# Patient Record
Sex: MALE | Race: WHITE
[De-identification: names, ages, dates, MRNs, and addresses within clinical notes are randomized per-mention and may not be internally consistent; named-entity substitution may affect disease eponyms.]

---

## 2019-01-20 ENCOUNTER — RX RENEWAL (OUTPATIENT)
Age: 59
End: 2019-01-20

## 2019-03-28 ENCOUNTER — APPOINTMENT (OUTPATIENT)
Dept: CARDIOLOGY | Facility: CLINIC | Age: 59
End: 2019-03-28
Payer: COMMERCIAL

## 2019-03-28 VITALS
DIASTOLIC BLOOD PRESSURE: 70 MMHG | WEIGHT: 188 LBS | SYSTOLIC BLOOD PRESSURE: 125 MMHG | HEIGHT: 67 IN | BODY MASS INDEX: 29.51 KG/M2

## 2019-03-28 DIAGNOSIS — Z78.9 OTHER SPECIFIED HEALTH STATUS: ICD-10-CM

## 2019-03-28 PROCEDURE — 99214 OFFICE O/P EST MOD 30 MIN: CPT

## 2019-03-28 NOTE — PHYSICAL EXAM
[General Appearance - Well Developed] : well developed [Normal Appearance] : normal appearance [Well Groomed] : well groomed [General Appearance - Well Nourished] : well nourished [No Deformities] : no deformities [General Appearance - In No Acute Distress] : no acute distress no

## 2019-03-28 NOTE — HISTORY OF PRESENT ILLNESS
[FreeTextEntry1] : pt is feeling well\par  no chest pain\par  diabetes is better\par  fbs at home 110 to 120\par  hgba1c  done on 3/25/19 6.5 well controlled\par  bp 130/80

## 2019-04-26 ENCOUNTER — RX RENEWAL (OUTPATIENT)
Age: 59
End: 2019-04-26

## 2019-05-20 ENCOUNTER — RX RENEWAL (OUTPATIENT)
Age: 59
End: 2019-05-20

## 2019-06-12 ENCOUNTER — MEDICATION RENEWAL (OUTPATIENT)
Age: 59
End: 2019-06-12

## 2019-06-12 ENCOUNTER — RX RENEWAL (OUTPATIENT)
Age: 59
End: 2019-06-12

## 2019-06-18 ENCOUNTER — RX RENEWAL (OUTPATIENT)
Age: 59
End: 2019-06-18

## 2019-07-18 ENCOUNTER — APPOINTMENT (OUTPATIENT)
Dept: CARDIOLOGY | Facility: CLINIC | Age: 59
End: 2019-07-18
Payer: COMMERCIAL

## 2019-07-18 VITALS
SYSTOLIC BLOOD PRESSURE: 118 MMHG | DIASTOLIC BLOOD PRESSURE: 70 MMHG | WEIGHT: 191 LBS | HEIGHT: 67 IN | BODY MASS INDEX: 29.98 KG/M2

## 2019-07-18 PROCEDURE — 99213 OFFICE O/P EST LOW 20 MIN: CPT

## 2019-07-18 NOTE — HISTORY OF PRESENT ILLNESS
[FreeTextEntry1] : pt is feeling well\par  last blood work hgba1c 6.5\par  no chest pains\par  no hypoglycemia

## 2019-08-06 ENCOUNTER — RX RENEWAL (OUTPATIENT)
Age: 59
End: 2019-08-06

## 2019-09-12 ENCOUNTER — MEDICATION RENEWAL (OUTPATIENT)
Age: 59
End: 2019-09-12

## 2019-10-23 ENCOUNTER — RX RENEWAL (OUTPATIENT)
Age: 59
End: 2019-10-23

## 2019-10-31 ENCOUNTER — APPOINTMENT (OUTPATIENT)
Dept: CARDIOLOGY | Facility: CLINIC | Age: 59
End: 2019-10-31

## 2019-11-13 ENCOUNTER — RX RENEWAL (OUTPATIENT)
Age: 59
End: 2019-11-13

## 2019-12-10 ENCOUNTER — RX RENEWAL (OUTPATIENT)
Age: 59
End: 2019-12-10

## 2019-12-11 ENCOUNTER — RX RENEWAL (OUTPATIENT)
Age: 59
End: 2019-12-11

## 2019-12-12 ENCOUNTER — RX RENEWAL (OUTPATIENT)
Age: 59
End: 2019-12-12

## 2019-12-30 DIAGNOSIS — Z79.01 LONG TERM (CURRENT) USE OF ANTICOAGULANTS: ICD-10-CM

## 2020-01-10 LAB
INR PPP: 2.3 RATIO
POCT-PROTHROMBIN TIME: 27.1 SECS

## 2020-01-11 ENCOUNTER — INPATIENT (INPATIENT)
Facility: HOSPITAL | Age: 60
LOS: 0 days | Discharge: HOME | End: 2020-01-12
Attending: HOSPITALIST | Admitting: HOSPITALIST
Payer: COMMERCIAL

## 2020-01-11 VITALS
HEART RATE: 83 BPM | TEMPERATURE: 96 F | DIASTOLIC BLOOD PRESSURE: 90 MMHG | RESPIRATION RATE: 18 BRPM | OXYGEN SATURATION: 99 % | SYSTOLIC BLOOD PRESSURE: 180 MMHG

## 2020-01-11 LAB
ALBUMIN SERPL ELPH-MCNC: 4.6 G/DL — SIGNIFICANT CHANGE UP (ref 3.5–5.2)
ALP SERPL-CCNC: 82 U/L — SIGNIFICANT CHANGE UP (ref 30–115)
ALT FLD-CCNC: 34 U/L — SIGNIFICANT CHANGE UP (ref 0–41)
ANION GAP SERPL CALC-SCNC: 18 MMOL/L — HIGH (ref 7–14)
APPEARANCE UR: CLEAR — SIGNIFICANT CHANGE UP
AST SERPL-CCNC: 22 U/L — SIGNIFICANT CHANGE UP (ref 0–41)
BACTERIA # UR AUTO: NEGATIVE — SIGNIFICANT CHANGE UP
BASOPHILS # BLD AUTO: 0.04 K/UL — SIGNIFICANT CHANGE UP (ref 0–0.2)
BASOPHILS NFR BLD AUTO: 0.6 % — SIGNIFICANT CHANGE UP (ref 0–1)
BILIRUB DIRECT SERPL-MCNC: <0.2 MG/DL — SIGNIFICANT CHANGE UP (ref 0–0.2)
BILIRUB INDIRECT FLD-MCNC: >0.1 MG/DL — LOW (ref 0.2–1.2)
BILIRUB SERPL-MCNC: 0.3 MG/DL — SIGNIFICANT CHANGE UP (ref 0.2–1.2)
BILIRUB UR-MCNC: NEGATIVE — SIGNIFICANT CHANGE UP
BUN SERPL-MCNC: 15 MG/DL — SIGNIFICANT CHANGE UP (ref 10–20)
CALCIUM SERPL-MCNC: 9.7 MG/DL — SIGNIFICANT CHANGE UP (ref 8.5–10.1)
CHLORIDE SERPL-SCNC: 101 MMOL/L — SIGNIFICANT CHANGE UP (ref 98–110)
CO2 SERPL-SCNC: 20 MMOL/L — SIGNIFICANT CHANGE UP (ref 17–32)
COLOR SPEC: SIGNIFICANT CHANGE UP
CREAT SERPL-MCNC: 0.8 MG/DL — SIGNIFICANT CHANGE UP (ref 0.7–1.5)
DIFF PNL FLD: ABNORMAL
EOSINOPHIL # BLD AUTO: 0.27 K/UL — SIGNIFICANT CHANGE UP (ref 0–0.7)
EOSINOPHIL NFR BLD AUTO: 4.1 % — SIGNIFICANT CHANGE UP (ref 0–8)
EPI CELLS # UR: 0 /HPF — SIGNIFICANT CHANGE UP (ref 0–5)
GLUCOSE BLDC GLUCOMTR-MCNC: 201 MG/DL — HIGH (ref 70–99)
GLUCOSE SERPL-MCNC: 195 MG/DL — HIGH (ref 70–99)
GLUCOSE UR QL: NEGATIVE — SIGNIFICANT CHANGE UP
HCT VFR BLD CALC: 37.5 % — LOW (ref 42–52)
HGB BLD-MCNC: 13.2 G/DL — LOW (ref 14–18)
HYALINE CASTS # UR AUTO: 1 /LPF — SIGNIFICANT CHANGE UP (ref 0–7)
IMM GRANULOCYTES NFR BLD AUTO: 0.6 % — HIGH (ref 0.1–0.3)
KETONES UR-MCNC: SIGNIFICANT CHANGE UP
LACTATE SERPL-SCNC: 2.2 MMOL/L — HIGH (ref 0.7–2)
LACTATE SERPL-SCNC: 3.7 MMOL/L — HIGH (ref 0.7–2)
LEUKOCYTE ESTERASE UR-ACNC: NEGATIVE — SIGNIFICANT CHANGE UP
LIDOCAIN IGE QN: 33 U/L — SIGNIFICANT CHANGE UP (ref 7–60)
LYMPHOCYTES # BLD AUTO: 1.77 K/UL — SIGNIFICANT CHANGE UP (ref 1.2–3.4)
LYMPHOCYTES # BLD AUTO: 27.1 % — SIGNIFICANT CHANGE UP (ref 20.5–51.1)
MCHC RBC-ENTMCNC: 32 PG — HIGH (ref 27–31)
MCHC RBC-ENTMCNC: 35.2 G/DL — SIGNIFICANT CHANGE UP (ref 32–37)
MCV RBC AUTO: 91 FL — SIGNIFICANT CHANGE UP (ref 80–94)
MONOCYTES # BLD AUTO: 0.44 K/UL — SIGNIFICANT CHANGE UP (ref 0.1–0.6)
MONOCYTES NFR BLD AUTO: 6.7 % — SIGNIFICANT CHANGE UP (ref 1.7–9.3)
NEUTROPHILS # BLD AUTO: 3.96 K/UL — SIGNIFICANT CHANGE UP (ref 1.4–6.5)
NEUTROPHILS NFR BLD AUTO: 60.9 % — SIGNIFICANT CHANGE UP (ref 42.2–75.2)
NITRITE UR-MCNC: NEGATIVE — SIGNIFICANT CHANGE UP
NRBC # BLD: 0 /100 WBCS — SIGNIFICANT CHANGE UP (ref 0–0)
PH UR: 6.5 — SIGNIFICANT CHANGE UP (ref 5–8)
PLATELET # BLD AUTO: 178 K/UL — SIGNIFICANT CHANGE UP (ref 130–400)
POTASSIUM SERPL-MCNC: 4.2 MMOL/L — SIGNIFICANT CHANGE UP (ref 3.5–5)
POTASSIUM SERPL-SCNC: 4.2 MMOL/L — SIGNIFICANT CHANGE UP (ref 3.5–5)
PROT SERPL-MCNC: 7.7 G/DL — SIGNIFICANT CHANGE UP (ref 6–8)
PROT UR-MCNC: ABNORMAL
RBC # BLD: 4.12 M/UL — LOW (ref 4.7–6.1)
RBC # FLD: 12 % — SIGNIFICANT CHANGE UP (ref 11.5–14.5)
RBC CASTS # UR COMP ASSIST: 31 /HPF — HIGH (ref 0–4)
SODIUM SERPL-SCNC: 139 MMOL/L — SIGNIFICANT CHANGE UP (ref 135–146)
SP GR SPEC: 1.02 — SIGNIFICANT CHANGE UP (ref 1.01–1.02)
UROBILINOGEN FLD QL: SIGNIFICANT CHANGE UP
WBC # BLD: 6.52 K/UL — SIGNIFICANT CHANGE UP (ref 4.8–10.8)
WBC # FLD AUTO: 6.52 K/UL — SIGNIFICANT CHANGE UP (ref 4.8–10.8)
WBC UR QL: 1 /HPF — SIGNIFICANT CHANGE UP (ref 0–5)

## 2020-01-11 PROCEDURE — 99222 1ST HOSP IP/OBS MODERATE 55: CPT

## 2020-01-11 PROCEDURE — 74177 CT ABD & PELVIS W/CONTRAST: CPT | Mod: 26

## 2020-01-11 PROCEDURE — 99285 EMERGENCY DEPT VISIT HI MDM: CPT

## 2020-01-11 RX ORDER — MORPHINE SULFATE 50 MG/1
6 CAPSULE, EXTENDED RELEASE ORAL ONCE
Refills: 0 | Status: DISCONTINUED | OUTPATIENT
Start: 2020-01-11 | End: 2020-01-11

## 2020-01-11 RX ORDER — DEXTROSE 50 % IN WATER 50 %
25 SYRINGE (ML) INTRAVENOUS ONCE
Refills: 0 | Status: DISCONTINUED | OUTPATIENT
Start: 2020-01-11 | End: 2020-01-12

## 2020-01-11 RX ORDER — SODIUM CHLORIDE 9 MG/ML
1000 INJECTION, SOLUTION INTRAVENOUS ONCE
Refills: 0 | Status: COMPLETED | OUTPATIENT
Start: 2020-01-11 | End: 2020-01-11

## 2020-01-11 RX ORDER — HEPARIN SODIUM 5000 [USP'U]/ML
5000 INJECTION INTRAVENOUS; SUBCUTANEOUS EVERY 12 HOURS
Refills: 0 | Status: DISCONTINUED | OUTPATIENT
Start: 2020-01-11 | End: 2020-01-12

## 2020-01-11 RX ORDER — MORPHINE SULFATE 50 MG/1
4 CAPSULE, EXTENDED RELEASE ORAL EVERY 4 HOURS
Refills: 0 | Status: DISCONTINUED | OUTPATIENT
Start: 2020-01-11 | End: 2020-01-12

## 2020-01-11 RX ORDER — DEXTROSE 50 % IN WATER 50 %
15 SYRINGE (ML) INTRAVENOUS ONCE
Refills: 0 | Status: DISCONTINUED | OUTPATIENT
Start: 2020-01-11 | End: 2020-01-12

## 2020-01-11 RX ORDER — TAMSULOSIN HYDROCHLORIDE 0.4 MG/1
0.4 CAPSULE ORAL AT BEDTIME
Refills: 0 | Status: DISCONTINUED | OUTPATIENT
Start: 2020-01-11 | End: 2020-01-12

## 2020-01-11 RX ORDER — TICAGRELOR 90 MG/1
60 TABLET ORAL
Refills: 0 | Status: DISCONTINUED | OUTPATIENT
Start: 2020-01-11 | End: 2020-01-12

## 2020-01-11 RX ORDER — KETOROLAC TROMETHAMINE 30 MG/ML
15 SYRINGE (ML) INJECTION ONCE
Refills: 0 | Status: DISCONTINUED | OUTPATIENT
Start: 2020-01-11 | End: 2020-01-11

## 2020-01-11 RX ORDER — ATORVASTATIN CALCIUM 80 MG/1
40 TABLET, FILM COATED ORAL AT BEDTIME
Refills: 0 | Status: DISCONTINUED | OUTPATIENT
Start: 2020-01-11 | End: 2020-01-12

## 2020-01-11 RX ORDER — AMLODIPINE BESYLATE 2.5 MG/1
5 TABLET ORAL DAILY
Refills: 0 | Status: DISCONTINUED | OUTPATIENT
Start: 2020-01-11 | End: 2020-01-12

## 2020-01-11 RX ORDER — ASPIRIN/CALCIUM CARB/MAGNESIUM 324 MG
81 TABLET ORAL DAILY
Refills: 0 | Status: DISCONTINUED | OUTPATIENT
Start: 2020-01-11 | End: 2020-01-12

## 2020-01-11 RX ORDER — INSULIN LISPRO 100/ML
5 VIAL (ML) SUBCUTANEOUS
Refills: 0 | Status: DISCONTINUED | OUTPATIENT
Start: 2020-01-11 | End: 2020-01-12

## 2020-01-11 RX ORDER — INSULIN LISPRO 100/ML
VIAL (ML) SUBCUTANEOUS
Refills: 0 | Status: DISCONTINUED | OUTPATIENT
Start: 2020-01-11 | End: 2020-01-12

## 2020-01-11 RX ORDER — SODIUM CHLORIDE 9 MG/ML
1000 INJECTION, SOLUTION INTRAVENOUS
Refills: 0 | Status: DISCONTINUED | OUTPATIENT
Start: 2020-01-11 | End: 2020-01-12

## 2020-01-11 RX ORDER — INSULIN GLARGINE 100 [IU]/ML
8 INJECTION, SOLUTION SUBCUTANEOUS AT BEDTIME
Refills: 0 | Status: DISCONTINUED | OUTPATIENT
Start: 2020-01-11 | End: 2020-01-12

## 2020-01-11 RX ORDER — GLUCAGON INJECTION, SOLUTION 0.5 MG/.1ML
1 INJECTION, SOLUTION SUBCUTANEOUS ONCE
Refills: 0 | Status: DISCONTINUED | OUTPATIENT
Start: 2020-01-11 | End: 2020-01-12

## 2020-01-11 RX ORDER — DEXTROSE 50 % IN WATER 50 %
12.5 SYRINGE (ML) INTRAVENOUS ONCE
Refills: 0 | Status: DISCONTINUED | OUTPATIENT
Start: 2020-01-11 | End: 2020-01-12

## 2020-01-11 RX ADMIN — SODIUM CHLORIDE 1000 MILLILITER(S): 9 INJECTION, SOLUTION INTRAVENOUS at 15:02

## 2020-01-11 RX ADMIN — SODIUM CHLORIDE 75 MILLILITER(S): 9 INJECTION, SOLUTION INTRAVENOUS at 22:50

## 2020-01-11 RX ADMIN — Medication 15 MILLIGRAM(S): at 17:38

## 2020-01-11 RX ADMIN — MORPHINE SULFATE 6 MILLIGRAM(S): 50 CAPSULE, EXTENDED RELEASE ORAL at 15:25

## 2020-01-11 RX ADMIN — Medication 15 MILLIGRAM(S): at 13:17

## 2020-01-11 RX ADMIN — MORPHINE SULFATE 6 MILLIGRAM(S): 50 CAPSULE, EXTENDED RELEASE ORAL at 19:21

## 2020-01-11 RX ADMIN — TAMSULOSIN HYDROCHLORIDE 0.4 MILLIGRAM(S): 0.4 CAPSULE ORAL at 23:16

## 2020-01-11 RX ADMIN — MORPHINE SULFATE 6 MILLIGRAM(S): 50 CAPSULE, EXTENDED RELEASE ORAL at 15:01

## 2020-01-11 RX ADMIN — SODIUM CHLORIDE 1000 MILLILITER(S): 9 INJECTION, SOLUTION INTRAVENOUS at 13:17

## 2020-01-11 RX ADMIN — MORPHINE SULFATE 6 MILLIGRAM(S): 50 CAPSULE, EXTENDED RELEASE ORAL at 20:17

## 2020-01-11 NOTE — ED PROVIDER NOTE - OBJECTIVE STATEMENT
58 y/o M PMHx HTN, HLD, DM, brain aneurysm s/p coil on Brilinta p/w R flank pain that started last night. Described as mild, aching, and radiating to R groin. No OTC meds for pain relief. Pt admits to urinary hesitancy. Denies fever, chills, CP, SOB, n/v, dysuria, hematuria, and rash.

## 2020-01-11 NOTE — CONSULT NOTE ADULT - SUBJECTIVE AND OBJECTIVE BOX
HPI:  58 y/o male with hx of HTN, DM, brain aneurysm s/p coiling on Brilinta and nephrolithiasis requiring multiple procedures in the past including stent placements and lithotripsy. Presents today with 1 day hx of colicky R flank pain radiating to R groin. Deneis any fevers, chills, vomiting. Denies dysuria, hematuria or difficulty voiding but is experiencing increased frequency.     PAST MEDICAL & SURGICAL HISTORY:      REVIEW OF SYSTEMS:    CONSTITUTIONAL:  No fevers or chills  HEENT: No visual changes  ENDO: No sweating  NECK: No pain or stiffness  MUSCULOSKELETAL: No back pain, no joint pain  RESPIRATORY: No shortness of breath  CARDIOVASCULAR: No chest pain  GASTROINTESTINAL: No abdominal or epigastric pain. No nausea, vomiting,  No diarrhea or constipation.   NEUROLOGICAL: No mental status changes  PSYCH: No depression, no mood changes  SKIN: No itching      MEDICATIONS  (STANDING):  morphine  - Injectable 6 milliGRAM(s) IV Push Once    MEDICATIONS  (PRN):      Allergies    No Known Allergies    Intolerances        SOCIAL HISTORY: No illicit drug use    FAMILY HISTORY:      Vital Signs Last 24 Hrs  T(C): 35.6 (2020 16:30), Max: 36.6 (2020 14:55)  T(F): 96.1 (2020 16:30), Max: 97.9 (2020 14:55)  HR: 63 (2020 16:30) (63 - 83)  BP: 146/69 (2020 16:30) (146/69 - 180/90)  BP(mean): --  RR: 20 (2020 16:30) (18 - 20)  SpO2: 100% (2020 16:30) (99% - 100%)    PHYSICAL EXAM:    Constitutional: NAD, well-developed, awake/alert  HEENT: EOMI  Neck: no pain  Back: No CVA tenderness B/L, + R flank pain  Respiratory: No accessory respiratory muscle use  Abd: Soft, NT/ND, bladder non palpable, no suprapubic tenderness  Extremities: no edema  Neurological: A/O x 3  Psychiatric: Normal mood, normal affect      I&O's Summary      LABS:                        13.2   6.52  )-----------( 178      ( 2020 12:50 )             37.5     11    139  |  101  |  15  ----------------------------<  195<H>  4.2   |  20  |  0.8    Ca    9.7      2020 12:50    TPro  7.7  /  Alb  4.6  /  TBili  0.3  /  DBili  <0.2  /  AST  22  /  ALT  34  /  AlkPhos  82  -      Urinalysis Basic - ( 2020 15:40 )    Color: Light Yellow / Appearance: Clear / S.021 / pH: x  Gluc: x / Ketone: Trace  / Bili: Negative / Urobili: <2 mg/dL   Blood: x / Protein: 30 mg/dL / Nitrite: Negative   Leuk Esterase: Negative / RBC: 31 /HPF / WBC 1 /HPF   Sq Epi: x / Non Sq Epi: 0 /HPF / Bacteria: Negative            RADIOLOGY & ADDITIONAL STUDIES:  < from: CT Abdomen and Pelvis w/ IV Cont (20 @ 17:19) >    EXAM:  CT ABDOMEN AND PELVIS IC            PROCEDURE DATE:  2020            INTERPRETATION:  CLINICAL STATEMENT: Right flank pain.      TECHNIQUE: Contiguous axial CT images were obtained from the lower chest to the pubic symphysis following administration of 100cc Optiray 320 intravenous contrast.  Oral contrast was not administered.  Reformatted images in the coronal and sagittal planes were acquired.    COMPARISON CT: Abdomen and pelvis dated 2010.    OTHER STUDIES USED FOR CORRELATION: None.       FINDINGS:    LOWER CHEST: Dependent bibasilar atelectasis.    HEPATOBILIARY: Hepatic steatosis. No intrahepatic or extrahepatic biliary ductal dilatation. The gallbladder is present.    SPLEEN: Unremarkable.    PANCREAS: Unremarkable.    ADRENAL GLANDS: Unremarkable.    KIDNEYS: Moderate right hydroureteronephrosis secondary to an obstructing calculus in the proximal right ureter measuring up to 11 mm with maximal density of 842 Hounsfield units. In addition there is a second 6.5 mm stone in the distal right ureter. Multiple nonobstructing renal calculi bilaterally, the largest in the right lower pole measuring up to 4 mm. Bilateral renal cysts and subcentimeter hypodensities that are too small to further characterize. No left hydronephrosis    ABDOMINOPELVIC NODES: No abdominopelvic lymphadenopathy.    PELVIC ORGANS: Distended urinary bladder.    PERITONEUM/MESENTERY/BOWEL: Nonspecific soft tissue density anterior to the right urinary bladder measuring up to 2.3 x 2.2 cm (series 3 image 93). No evidence of bowel obstruction, pneumoperitoneum, or ascites. Normal caliber appendix. There is mild right perinephric and periureteral stranding.    BONES/SOFT TISSUES: No evidence of acute osseous abnormality. Degenerative changes of the visualized thoracal lumbar spine.    OTHER: Calcified atherosclerotic disease of the aorta and branch vessels.      IMPRESSION:     Moderate right hydroureteronephrosis secondary to an 11 mm obstructing calculus in the proximal right ureter. In addition there is a second 6.5 mm stone in the distal right ureter.    Multiple nonobstructing renal calculi in the bilateral collecting systems.     soft tissue density anterior to the urinary bladder, measuring up to 2.2 cm. Etiology not apparent in this exam.    Hepatic steatosis.              GERALD DURON M.D., RESIDENT RADIOLOGIST  This document has been electronically signed.  JEFRY BRADY M.D., ATTENDING RADIOLOGIST  This document has been electronically signed. 2020  6:05PM                < end of copied text > HPI:  60 y/o male with hx of HTN, DM, brain aneurysm s/p coiling on Brilinta and nephrolithiasis requiring multiple procedures in the past including stent placements and lithotripsy. Presents today with 1 day hx of colicky intermittent R flank pain radiating to R groin. Deneis any fevers, chills, vomiting. Denies dysuria, hematuria or difficulty voiding but is experiencing increased frequency.     PAST MEDICAL & SURGICAL HISTORY:      REVIEW OF SYSTEMS:    CONSTITUTIONAL:  No fevers or chills  HEENT: No visual changes  ENDO: No sweating  NECK: No pain or stiffness  MUSCULOSKELETAL: No back pain, no joint pain  RESPIRATORY: No shortness of breath  CARDIOVASCULAR: No chest pain  GASTROINTESTINAL: No abdominal or epigastric pain. No nausea, vomiting,  No diarrhea or constipation.   NEUROLOGICAL: No mental status changes  PSYCH: No depression, no mood changes  SKIN: No itching      MEDICATIONS  (STANDING):  morphine  - Injectable 6 milliGRAM(s) IV Push Once    MEDICATIONS  (PRN):      Allergies    No Known Allergies    Intolerances        SOCIAL HISTORY: No illicit drug use    FAMILY HISTORY:  no gu malig    Vital Signs Last 24 Hrs  T(C): 35.6 (2020 16:30), Max: 36.6 (2020 14:55)  T(F): 96.1 (2020 16:30), Max: 97.9 (2020 14:55)  HR: 63 (2020 16:30) (63 - 83)  BP: 146/69 (2020 16:30) (146/69 - 180/90)  BP(mean): --  RR: 20 (2020 16:30) (18 - 20)  SpO2: 100% (2020 16:30) (99% - 100%)    PHYSICAL EXAM:    Constitutional: NAD, well-developed, awake/alert  HEENT: EOMI  Neck: no pain  Back: No CVA tenderness B/L, + R flank pain  Respiratory: No accessory respiratory muscle use  Abd: Soft, NT/ND, bladder non palpable, no suprapubic tenderness  Extremities: no edema  Neurological: A/O x 3  Psychiatric: Normal mood, normal affect      I&O's Summary      LABS:                        13.2   6.52  )-----------( 178      ( 2020 12:50 )             37.5     11    139  |  101  |  15  ----------------------------<  195<H>  4.2   |  20  |  0.8    Ca    9.7      2020 12:50    TPro  7.7  /  Alb  4.6  /  TBili  0.3  /  DBili  <0.2  /  AST  22  /  ALT  34  /  AlkPhos  82  01-11      Urinalysis Basic - ( 2020 15:40 )    Color: Light Yellow / Appearance: Clear / S.021 / pH: x  Gluc: x / Ketone: Trace  / Bili: Negative / Urobili: <2 mg/dL   Blood: x / Protein: 30 mg/dL / Nitrite: Negative   Leuk Esterase: Negative / RBC: 31 /HPF / WBC 1 /HPF   Sq Epi: x / Non Sq Epi: 0 /HPF / Bacteria: Negative            RADIOLOGY & ADDITIONAL STUDIES:  < from: CT Abdomen and Pelvis w/ IV Cont (20 @ 17:19) >    EXAM:  CT ABDOMEN AND PELVIS IC            PROCEDURE DATE:  2020            INTERPRETATION:  CLINICAL STATEMENT: Right flank pain.      TECHNIQUE: Contiguous axial CT images were obtained from the lower chest to the pubic symphysis following administration of 100cc Optiray 320 intravenous contrast.  Oral contrast was not administered.  Reformatted images in the coronal and sagittal planes were acquired.    COMPARISON CT: Abdomen and pelvis dated 2010.    OTHER STUDIES USED FOR CORRELATION: None.       FINDINGS:    LOWER CHEST: Dependent bibasilar atelectasis.    HEPATOBILIARY: Hepatic steatosis. No intrahepatic or extrahepatic biliary ductal dilatation. The gallbladder is present.    SPLEEN: Unremarkable.    PANCREAS: Unremarkable.    ADRENAL GLANDS: Unremarkable.    KIDNEYS: Moderate right hydroureteronephrosis secondary to an obstructing calculus in the proximal right ureter measuring up to 11 mm with maximal density of 842 Hounsfield units. In addition there is a second 6.5 mm stone in the distal right ureter. Multiple nonobstructing renal calculi bilaterally, the largest in the right lower pole measuring up to 4 mm. Bilateral renal cysts and subcentimeter hypodensities that are too small to further characterize. No left hydronephrosis    ABDOMINOPELVIC NODES: No abdominopelvic lymphadenopathy.    PELVIC ORGANS: Distended urinary bladder.    PERITONEUM/MESENTERY/BOWEL: Nonspecific soft tissue density anterior to the right urinary bladder measuring up to 2.3 x 2.2 cm (series 3 image 93). No evidence of bowel obstruction, pneumoperitoneum, or ascites. Normal caliber appendix. There is mild right perinephric and periureteral stranding.    BONES/SOFT TISSUES: No evidence of acute osseous abnormality. Degenerative changes of the visualized thoracal lumbar spine.    OTHER: Calcified atherosclerotic disease of the aorta and branch vessels.      IMPRESSION:     Moderate right hydroureteronephrosis secondary to an 11 mm obstructing calculus in the proximal right ureter. In addition there is a second 6.5 mm stone in the distal right ureter.    Multiple nonobstructing renal calculi in the bilateral collecting systems.     soft tissue density anterior to the urinary bladder, measuring up to 2.2 cm. Etiology not apparent in this exam.    Hepatic steatosis.              GERALD DURON M.D., RESIDENT RADIOLOGIST  This document has been electronically signed.  JEFRY BRADY M.D., ATTENDING RADIOLOGIST  This document has been electronically signed. 2020  6:05PM                < end of copied text >

## 2020-01-11 NOTE — ED PROVIDER NOTE - PROGRESS NOTE DETAILS
uro radha aware will see in ed CT result noted with 1.1cm prox ureteral stone. Endorsed to Dr Coleman to f/u urology eval and dispo. urology bedside, recommending admission to medicine. plan for urologic procedure tomorrow by Dr. ma. chema rossi aware, aware for toradol for pain meds, npo after midnight. mar will follow rpt lactate.

## 2020-01-11 NOTE — ED PROVIDER NOTE - ATTENDING CONTRIBUTION TO CARE
60yo man h/o HTN, HLD, DM, cerebral aneurysm s/p coil, on Brilinta, also frequent kidney stones c/o R flank pain that began last night and worsened this am, associated with nausea. Radiates to the groin. No gross hematuria, fever, chills, back pain. On exam he is nontoxic but uncomfortable, lungs CTA, CVS1S2 RRR abd soft, NT, R CVA discomfort with palpation, no rash. Will check labs, UA, CT abd/pelvis for stone.

## 2020-01-11 NOTE — CONSULT NOTE ADULT - ASSESSMENT
60 y/o male with obstructing 11mm and 6mm R ureteral calculus and moderate right hydro  - UA, UCx  - IV fluid hydration  - Monitor vitals  - Flomax  - Pain control  - On addon for cystoscopy stent placement tomorrow morning  - NPO after midnight  - Discussed with Dr. Cai

## 2020-01-11 NOTE — CONSULT NOTE ADULT - ATTENDING COMMENTS
pt seen and examined. having renal colic refractory to pain medication. ct a/p images visualized show obstructing large ureteral stone and an additioanl stone in ureter w hydro. pt understood r/b/a ofprocedure. understood stent can only stay in max 3 months and he has to fu w me as outpatinet to have stones removed.

## 2020-01-11 NOTE — H&P ADULT - ASSESSMENT
58 y/o male with hx of HTN, DM, brain aneurysm s/p coiling on Brilinta and nephrolithiasis requiring multiple procedures in the past including stent placements and lithotripsy. Presents today with 1 day hx of colicky R flank pain radiating to R groin. Deneis any fevers, chills, vomiting. Denies dysuria, hematuria or difficulty voiding but is experiencing increased frequency. In the Ed pt underwent CT scan which was consistent with Right sided hydro along with urolithiasis. Pt was evaluated by urology in ED.     #) obstructive uropathy   - admit to medicine  - CT scan consistent with hydro  - IV fluid hydration  - Flomax  - Pain control  - On addon for cystoscopy stent placement tomorrow morning per urology   - NPO after midnight    #) Brain aneurysm s/p coiling  - continue brillinta and aspirin    #) HTN  - can use amlodipine for now    #) DM  - FS and insulin protocol    #) DLD  - statin    #) Diet NPO overnight     #) DVT PPX heparin

## 2020-01-11 NOTE — H&P ADULT - NSHPLABSRESULTS_GEN_ALL_CORE
CBC Full  -  ( 11 Jan 2020 12:50 )  WBC Count : 6.52 K/uL  RBC Count : 4.12 M/uL  Hemoglobin : 13.2 g/dL  Hematocrit : 37.5 %  Platelet Count - Automated : 178 K/uL  Mean Cell Volume : 91.0 fL  Mean Cell Hemoglobin : 32.0 pg  Mean Cell Hemoglobin Concentration : 35.2 g/dL  Auto Neutrophil # : 3.96 K/uL  Auto Lymphocyte # : 1.77 K/uL  Auto Monocyte # : 0.44 K/uL  Auto Eosinophil # : 0.27 K/uL  Auto Basophil # : 0.04 K/uL  Auto Neutrophil % : 60.9 %  Auto Lymphocyte % : 27.1 %  Auto Monocyte % : 6.7 %  Auto Eosinophil % : 4.1 %  Auto Basophil % : 0.6 %      01-11    139  |  101  |  15  ----------------------------<  195<H>  4.2   |  20  |  0.8    Ca    9.7      11 Jan 2020 12:50    TPro  7.7  /  Alb  4.6  /  TBili  0.3  /  DBili  <0.2  /  AST  22  /  ALT  34  /  AlkPhos  82  01-11                    Moderate right hydroureteronephrosis secondary to an 11 mm obstructing calculus in the proximal right ureter. In addition there is a second 6.5 mm stone in the distal right ureter.    Multiple nonobstructing renal calculi in the bilateral collecting systems.     soft tissue density anterior to the urinary bladder, measuring up to 2.2 cm. Etiology not apparent in this exam.    Hepatic steatosis.

## 2020-01-11 NOTE — ED PROVIDER NOTE - NS ED ROS FT
Constitutional: (-) fever, (-) chills  Eyes: (-) visual changes  ENT: (-) nasal congestions  Cardiovascular: (-) chest pain, (-) syncope  Respiratory: (-) cough, (-) shortness of breath, (-) dyspnea,   Gastrointestinal: (-) vomiting, (-) diarrhea, (-)nausea, (+) flank pain   Musculoskeletal: (-) neck pain, (-) back pain, (-) joint pain,  Integumentary: (-) rash, (-) edema, (-) bruises  Neurological: (-) headache, (-) loc, (-) dizziness, (-) tingling, (-)numbness,  Peripheral Vascular: (-) leg swelling  :  (-)dysuria,  (-) hematuria, (+) hesitancy   Allergic/Immunologic: (-) pruritus

## 2020-01-11 NOTE — H&P ADULT - HISTORY OF PRESENT ILLNESS
60 y/o male with hx of HTN, DM, brain aneurysm s/p coiling on Brilinta and nephrolithiasis requiring multiple procedures in the past including stent placements and lithotripsy. Presents today with 1 day hx of colicky R flank pain radiating to R groin. Deneis any fevers, chills, vomiting. Denies dysuria, hematuria or difficulty voiding but is experiencing increased frequency. In the Ed pt underwent CT scan which was consistent with Right sided hydro along with urolithiasis. Pt was evaluated by urology in ED.     Pt not sure of HTN medication.

## 2020-01-11 NOTE — H&P ADULT - ATTENDING COMMENTS
I saw and evaluated patient  by bedside, c/o persistent right sided abdominal pain associated with hematuria, no fever.    All labs, radiology studies, VS was reviewed  Vital Signs Last 24 Hrs  T(C): 36.1 (12 Jan 2020 05:17), Max: 36.6 (11 Jan 2020 14:55)  T(F): 97 (12 Jan 2020 05:17), Max: 97.9 (11 Jan 2020 14:55)  HR: 70 (12 Jan 2020 05:17) (63 - 83)  BP: 142/73 (12 Jan 2020 05:17) (142/73 - 184/90)  RR: 18 (12 Jan 2020 05:17) (18 - 20)  SpO2: 99% (11 Jan 2020 18:45) (99% - 100%)  GENERAL: NAD, AAOX3, patient is laying comfortably in bed  HEENT: AT, NC, PERRLA, SUPPLE, NO JVD, NO CB  LUNG: CTA B/L  CVS: normal S1, S2, RRR, NO M/G/R  ABDOMEN: soft, bowel sounds present, normoactive in all 4 quadrants, tender in right upper/mid quadrant area during palpation, non-distended  EXT: no E/C/C, positive PP b/l extremities  NEURO: no acute focal neurological deficits  SKIN: no rash, no ecchymosis    I have reviewed the resident's note and agree with documented findings and  plan of care.  # Acute right sided obstructive uropathy due to nephrolithiasis- NPO, for cytoscopy and stent placement today. IVF, pain management.  #Lactic acidosis- resolved.  # Macrocytic anemia- start on daily vitamin B complex tx.  # h/o HTN, DM - resumed on home regimen tx.

## 2020-01-11 NOTE — H&P ADULT - NSHPPHYSICALEXAM_GEN_ALL_CORE
PHYSICAL EXAM:  GENERAL: NAD, well-developed  HEAD:  Atraumatic, Normocephalic  EYES: EOMI, PERRLA, conjunctiva and sclera clear  NECK: Supple, No JVD  CHEST/LUNG: Clear to auscultation bilaterally; No wheeze  HEART: Regular rate and rhythm; No murmurs, rubs, or gallops  ABDOMEN: Right flank pain  EXTREMITIES:  2+ Peripheral Pulses, No clubbing, cyanosis, or edema  PSYCH: AAOx3  NEUROLOGY: non-focal  SKIN: No rashes or lesions

## 2020-01-11 NOTE — ED PROVIDER NOTE - PHYSICAL EXAMINATION
Vital Signs: I have reviewed the initial vital signs.  Constitutional: well-nourished, appears stated age, no acute distress  Eyes: Conjunctiva pink, Sclera clear, PERRLA, EOMI.  Cardiovascular: S1 and S2, regular rate, regular rhythm, well-perfused extremities, radial pulses equal and 2+  Respiratory: unlabored respiratory effort, clear to auscultation bilaterally no wheezing, rales and rhonchi  Gastrointestinal: soft, (+) R CVA tenderness, no pulsatile mass, normal bowel sounds  Musculoskeletal: supple neck, no lower extremity edema, no midline tenderness  Integumentary: warm, dry, no rash  Neurologic: awake, alert, cranial nerves II-XII grossly intact, extremities’ motor and sensory functions grossly intact

## 2020-01-11 NOTE — ED ADULT NURSE NOTE - NSIMPLEMENTINTERV_GEN_ALL_ED
Implemented All Universal Safety Interventions:  [place] to call system. Call bell, personal items and telephone within reach. Instruct patient to call for assistance. Room bathroom lighting operational. Non-slip footwear when patient is off stretcher. Physically safe environment: no spills, clutter or unnecessary equipment. Stretcher in lowest position, wheels locked, appropriate side rails in place.

## 2020-01-11 NOTE — PATIENT PROFILE ADULT - NSPROMEDSPUMP_GEN_A_NUR
Subjective   Cachorro Davis is a 39 y.o. male.     Chief Complaint: Panic Attack    Panic Attack   This is a recurrent problem. The current episode started more than 1 year ago. The problem occurs every several days. The problem has been waxing and waning. Treatments tried: zoloft, cymbalta, buspar   The treatment provided no relief.   COPD   This is a chronic problem. The current episode started more than 1 year ago. The problem has been waxing and waning. Nothing aggravates the symptoms. Treatments tried: symbicort, albuterol. The treatment provided significant relief.   Diabetes   He presents for his follow-up diabetic visit. He has type 2 diabetes mellitus. His disease course has been stable. There are no hypoglycemic associated symptoms. Pertinent negatives for hypoglycemia include no headaches. There are no diabetic associated symptoms. Pertinent negatives for diabetes include no chest pain. There are no hypoglycemic complications. Symptoms are stable. There are no diabetic complications. Risk factors for coronary artery disease include diabetes mellitus, hypertension, male sex, obesity, sedentary lifestyle and tobacco exposure. Current diabetic treatment includes oral agent (monotherapy). He is following a generally unhealthy diet. When asked about meal planning, he reported none. He has not had a previous visit with a dietitian. He never participates in exercise. An ACE inhibitor/angiotensin II receptor blocker is being taken. He does not see a podiatrist.Eye exam is not current.  Blood sugar this morning 134.    Hypertension   This is a chronic problem. The current episode started more than 1 year ago. The problem is uncontrolled. Associated symptoms include peripheral edema. Pertinent negatives include no chest pain, headaches, palpitations or shortness of breath. There are no associated agents to hypertension. Risk factors for coronary artery disease include male gender, obesity, sedentary lifestyle,  smoking/tobacco exposure and diabetes mellitus. Past treatments include ACE inhibitors. Current antihypertension treatment includes ACE inhibitors. The current treatment provides mild improvement. Compliance problems include exercise and diet.    Heartburn   He complains of heartburn. He reports no abdominal pain, no chest pain or no nausea. This is a chronic problem. The current episode started more than 1 year ago. The problem occurs frequently. The heartburn does not wake him from sleep. The heartburn does not limit his activity. The heartburn doesn't change with position. The symptoms are aggravated by ETOH, medications, certain foods and smoking. Risk factors include lack of exercise, caffeine use, ETOH use, obesity and smoking/tobacco exposure. He has tried a PPI for the symptoms. The treatment provided moderate relief.   Nicotine Dependence   Presents for initial visit. Symptoms include cravings. Preferred tobacco types include cigarettes. Preferred cigarette types include filtered. Preferred strength is light. His urge triggers include company of smokers. His first smoke is from 8 to 10 AM. He smokes 2 packs of cigarettes per day. He started smoking when he was <12 years old. Past treatments include nothing. Cachorro is thinking about quitting. His past medical history is significant for alcohol abuse and drug use.   Back Pain   This is a chronic problem. The current episode started more than 1 year ago. The problem occurs constantly. The problem is unchanged. The pain is present in the thoracic spine and lumbar spine. The quality of the pain is described as aching and cramping. The pain does not radiate. The pain is moderate. The symptoms are aggravated by bending, standing and twisting. Pertinent negatives include no abdominal pain, bladder incontinence, bowel incontinence, chest pain or headaches. Risk factors include sedentary lifestyle and obesity. He has tried analgesics, muscle relaxant and NSAIDs for  the symptoms. The treatment provided no relief.            Family History   Problem Relation Age of Onset   • Cancer Mother    • Cancer Father    • Diabetes Maternal Grandmother    • Diabetes Maternal Grandfather    • Hypertension Maternal Grandfather    • Drug abuse Brother        Social History     Socioeconomic History   • Marital status: Legally      Spouse name: Not on file   • Number of children: Not on file   • Years of education: Not on file   • Highest education level: Not on file   Social Needs   • Financial resource strain: Not on file   • Food insecurity - worry: Not on file   • Food insecurity - inability: Not on file   • Transportation needs - medical: Not on file   • Transportation needs - non-medical: Not on file   Occupational History   • Not on file   Tobacco Use   • Smoking status: Current Every Day Smoker     Packs/day: 1.50     Types: Cigarettes   • Smokeless tobacco: Never Used   Substance and Sexual Activity   • Alcohol use: Yes     Binge frequency: Daily or almost daily     Comment: a pint of liquid daily    • Drug use: Yes     Types: Methamphetamines, Marijuana     Comment: gabapentin   • Sexual activity: Defer   Other Topics Concern   • Not on file   Social History Narrative   • Not on file       Past Medical History:   Diagnosis Date   • Anxiety     panic attacks   • Arthritis    • Chronic right shoulder pain    • COPD (chronic obstructive pulmonary disease) (CMS/HCC)    • Depression    • Diabetes mellitus (CMS/HCC)    • Dyslipidemia    • GERD (gastroesophageal reflux disease)    • Gout    • Hyperlipidemia    • Hypertension    • Insomnia    • Lower back pain    • Neuropathy    • Obesity    • Seizures (CMS/HCC)        Review of Systems   Constitutional: Negative.    HENT: Negative.    Respiratory: Negative.    Cardiovascular: Negative.    Gastrointestinal: Negative.    Musculoskeletal: Negative.    Skin: Negative.    Neurological: Negative.    Psychiatric/Behavioral: Negative for  "suicidal ideas. The patient is nervous/anxious.        Objective   Physical Exam   Constitutional: He is oriented to person, place, and time. He appears well-developed and well-nourished.   Neck: Normal range of motion. Neck supple.   Cardiovascular: Normal rate, regular rhythm and normal heart sounds.   Pulmonary/Chest: Effort normal and breath sounds normal.   Neurological: He is alert and oriented to person, place, and time.   Skin: Skin is warm and dry.   Psychiatric: His speech is normal and behavior is normal. Judgment and thought content normal. His mood appears anxious. Cognition and memory are normal.   Nursing note and vitals reviewed.      Procedures    Vitals: Blood pressure 136/80, pulse 109, temperature 97.9 °F (36.6 °C), temperature source Oral, height 190.5 cm (75\"), weight 119 kg (263 lb), SpO2 100 %.    Allergies:   Allergies   Allergen Reactions   • No Known Drug Allergy         During this visit the following were done:  Labs Reviewed []    Labs Ordered []    Radiology Reports Reviewed []    Radiology Ordered []    PCP Records Reviewed []    Referring Provider Records Reviewed []    ER Records Reviewed []    Hospital Records Reviewed []    History Obtained From Family []    Radiology Images Reviewed []    Other Reviewed []    Records Requested []      Assessment/Plan   Cachorro was seen today for panic attack.    Diagnoses and all orders for this visit:    Tobacco abuse    Chronic obstructive pulmonary disease, unspecified COPD type (CMS/[facility])  -     montelukast (SINGULAIR) 10 MG tablet; Take 1 tablet by mouth Every Night.  -     budesonide-formoterol (SYMBICORT) 160-4.5 MCG/ACT inhaler; Inhale 2 puffs 2 (Two) Times a Day.  -     albuterol sulfate HFA (VENTOLIN HFA) 108 (90 Base) MCG/ACT inhaler; Inhale 2 puffs Every 6 (Six) Hours As Needed for Wheezing.  -     CBC & Differential  -     Comprehensive Metabolic Panel  -     Hemoglobin A1c  -     Magnesium  -     TSH  -     Vitamin B12  -     Uric " Acid  -     CBC Auto Differential    Essential hypertension  -     lisinopril-hydrochlorothiazide (PRINZIDE,ZESTORETIC) 20-12.5 MG per tablet; Take 1 tablet by mouth Daily.  -     CBC & Differential  -     Comprehensive Metabolic Panel  -     Hemoglobin A1c  -     Magnesium  -     TSH  -     Vitamin B12  -     Uric Acid  -     CBC Auto Differential    Gastroesophageal reflux disease, esophagitis presence not specified  -     omeprazole (priLOSEC) 40 MG capsule; Take 1 capsule by mouth Daily.  -     CBC & Differential  -     Comprehensive Metabolic Panel  -     Hemoglobin A1c  -     Magnesium  -     TSH  -     Vitamin B12  -     Uric Acid  -     CBC Auto Differential    Muscle cramp, nocturnal  -     nabumetone (RELAFEN) 500 MG tablet; Take 1 tablet by mouth 2 (Two) Times a Day.    Type 2 diabetes mellitus without complication, without long-term current use of insulin (CMS/[facility])  -     metFORMIN (GLUCOPHAGE) 1000 MG tablet; Take 1 tablet by mouth 2 (Two) Times a Day With Meals.  -     CBC & Differential  -     Comprehensive Metabolic Panel  -     Hemoglobin A1c  -     Magnesium  -     TSH  -     Vitamin B12  -     Uric Acid  -     CBC Auto Differential   glipiZIDE (GLUCOTROL) 5 MG tablet; Take 1 tablet by mouth Daily.  Chronic gout without tophus, unspecified cause, unspecified site  -     CBC & Differential  -     Comprehensive Metabolic Panel  -     Hemoglobin A1c  -     Magnesium  -     TSH  -     Vitamin B12  -     Uric Acid  -     CBC Auto Differential     allopurinol (ZYLOPRIM) 100 MG tablet; Take 1 tablet by mouth Daily.  Generalized anxiety disorder with panic attacks  -     CBC & Differential  -     Comprehensive Metabolic Panel  -     Hemoglobin A1c  -     Magnesium  -     TSH  -     Vitamin B12  -     Uric Acid  -     amitriptyline (ELAVIL) 25 MG tablet; Take 1 tablet by mouth Every Night.  -     busPIRone (BUSPAR) 5 MG tablet; Take 1 tablet by mouth 2 (Two) Times a Day.  -     CBC Auto Differential      none

## 2020-01-11 NOTE — ED PROVIDER NOTE - NS_ACPWITHSCRIBE_ED_ALL_ED
Ladi Cruz called to request a refill on his medication. Rx pending. Last office visit : 4/26/2019   Next office visit : 8/30/2019     Last UDS:   Amphetamine Screen, Urine   Date Value Ref Range Status   04/26/2019 NEGATIVE  Final     Barbiturate Screen, Urine   Date Value Ref Range Status   04/26/2019 NEGATIVE  Final     Benzodiazepine Screen, Urine   Date Value Ref Range Status   04/26/2019 POSITIVE  Final     Comment:     DIAZEPAM      Buprenorphine Urine   Date Value Ref Range Status   04/26/2019 NEGATIVE  Final     Cocaine Metabolite Screen, Urine   Date Value Ref Range Status   04/26/2019 NEGATIVE  Final     Gabapentin Screen, Urine   Date Value Ref Range Status   04/26/2019 NEGATIVE  Final     MDMA, Urine   Date Value Ref Range Status   04/26/2019 NEGATIVE  Final     Methamphetamine, Urine   Date Value Ref Range Status   04/26/2019 NEGATIVE  Final     Opiate Scrn, Ur   Date Value Ref Range Status   04/26/2019 POSITIVE  Final     Comment:     HYDROCODONE     Oxycodone Screen, Ur   Date Value Ref Range Status   04/26/2019 NEGATIVE  Final     PCP Screen, Urine   Date Value Ref Range Status   04/26/2019 NEGATIVE  Final     Propoxyphene Screen, Urine   Date Value Ref Range Status   04/26/2019 NEGATIVE  Final     THC Screen, Urine   Date Value Ref Range Status   04/26/2019 NEGATIVE  Final     Tricyclic Antidepressants, Urine   Date Value Ref Range Status   04/26/2019 NEGATIVE  Final       Last Demetrius Gama: 4/10/19  Medication Contract: 9/20/18    Requested Prescriptions      No prescriptions requested or ordered in this encounter         Please approve or refuse this medication.    Андрей Caruso
"I personally performed the services described in the documentation  recorded by the scribe in my presence, and it accurately and completely records my words and action.”

## 2020-01-12 ENCOUNTER — TRANSCRIPTION ENCOUNTER (OUTPATIENT)
Age: 60
End: 2020-01-12

## 2020-01-12 VITALS
DIASTOLIC BLOOD PRESSURE: 66 MMHG | RESPIRATION RATE: 18 BRPM | OXYGEN SATURATION: 99 % | HEART RATE: 99 BPM | TEMPERATURE: 97 F | SYSTOLIC BLOOD PRESSURE: 130 MMHG

## 2020-01-12 LAB
ANION GAP SERPL CALC-SCNC: 15 MMOL/L — HIGH (ref 7–14)
BUN SERPL-MCNC: 13 MG/DL — SIGNIFICANT CHANGE UP (ref 10–20)
CALCIUM SERPL-MCNC: 9.2 MG/DL — SIGNIFICANT CHANGE UP (ref 8.5–10.1)
CHLORIDE SERPL-SCNC: 101 MMOL/L — SIGNIFICANT CHANGE UP (ref 98–110)
CO2 SERPL-SCNC: 22 MMOL/L — SIGNIFICANT CHANGE UP (ref 17–32)
CREAT SERPL-MCNC: 0.8 MG/DL — SIGNIFICANT CHANGE UP (ref 0.7–1.5)
GLUCOSE BLDC GLUCOMTR-MCNC: 174 MG/DL — HIGH (ref 70–99)
GLUCOSE BLDC GLUCOMTR-MCNC: 237 MG/DL — HIGH (ref 70–99)
GLUCOSE SERPL-MCNC: 148 MG/DL — HIGH (ref 70–99)
HCT VFR BLD CALC: 33 % — LOW (ref 42–52)
HGB BLD-MCNC: 11.5 G/DL — LOW (ref 14–18)
MCHC RBC-ENTMCNC: 31.8 PG — HIGH (ref 27–31)
MCHC RBC-ENTMCNC: 34.8 G/DL — SIGNIFICANT CHANGE UP (ref 32–37)
MCV RBC AUTO: 91.2 FL — SIGNIFICANT CHANGE UP (ref 80–94)
NRBC # BLD: 0 /100 WBCS — SIGNIFICANT CHANGE UP (ref 0–0)
PLATELET # BLD AUTO: 147 K/UL — SIGNIFICANT CHANGE UP (ref 130–400)
POTASSIUM SERPL-MCNC: 4 MMOL/L — SIGNIFICANT CHANGE UP (ref 3.5–5)
POTASSIUM SERPL-SCNC: 4 MMOL/L — SIGNIFICANT CHANGE UP (ref 3.5–5)
RBC # BLD: 3.62 M/UL — LOW (ref 4.7–6.1)
RBC # FLD: 12 % — SIGNIFICANT CHANGE UP (ref 11.5–14.5)
SODIUM SERPL-SCNC: 138 MMOL/L — SIGNIFICANT CHANGE UP (ref 135–146)
WBC # BLD: 5.76 K/UL — SIGNIFICANT CHANGE UP (ref 4.8–10.8)
WBC # FLD AUTO: 5.76 K/UL — SIGNIFICANT CHANGE UP (ref 4.8–10.8)

## 2020-01-12 PROCEDURE — 52332 CYSTOSCOPY AND TREATMENT: CPT | Mod: RT

## 2020-01-12 PROCEDURE — 99223 1ST HOSP IP/OBS HIGH 75: CPT

## 2020-01-12 RX ORDER — DEXTROSE 50 % IN WATER 50 %
25 SYRINGE (ML) INTRAVENOUS ONCE
Refills: 0 | Status: DISCONTINUED | OUTPATIENT
Start: 2020-01-12 | End: 2020-01-12

## 2020-01-12 RX ORDER — INSULIN LISPRO 100/ML
VIAL (ML) SUBCUTANEOUS
Refills: 0 | Status: DISCONTINUED | OUTPATIENT
Start: 2020-01-12 | End: 2020-01-12

## 2020-01-12 RX ORDER — GLUCAGON INJECTION, SOLUTION 0.5 MG/.1ML
1 INJECTION, SOLUTION SUBCUTANEOUS ONCE
Refills: 0 | Status: DISCONTINUED | OUTPATIENT
Start: 2020-01-12 | End: 2020-01-12

## 2020-01-12 RX ORDER — AMLODIPINE BESYLATE 2.5 MG/1
5 TABLET ORAL DAILY
Refills: 0 | Status: DISCONTINUED | OUTPATIENT
Start: 2020-01-12 | End: 2020-01-12

## 2020-01-12 RX ORDER — AMLODIPINE BESYLATE 2.5 MG/1
1 TABLET ORAL
Qty: 0 | Refills: 0 | DISCHARGE
Start: 2020-01-12

## 2020-01-12 RX ORDER — ONDANSETRON 8 MG/1
8 TABLET, FILM COATED ORAL ONCE
Refills: 0 | Status: DISCONTINUED | OUTPATIENT
Start: 2020-01-12 | End: 2020-01-12

## 2020-01-12 RX ORDER — ATORVASTATIN CALCIUM 80 MG/1
40 TABLET, FILM COATED ORAL AT BEDTIME
Refills: 0 | Status: DISCONTINUED | OUTPATIENT
Start: 2020-01-12 | End: 2020-01-12

## 2020-01-12 RX ORDER — SODIUM CHLORIDE 9 MG/ML
1000 INJECTION, SOLUTION INTRAVENOUS
Refills: 0 | Status: DISCONTINUED | OUTPATIENT
Start: 2020-01-12 | End: 2020-01-12

## 2020-01-12 RX ORDER — PHENAZOPYRIDINE HCL 100 MG
1 TABLET ORAL
Qty: 21 | Refills: 0
Start: 2020-01-12 | End: 2020-01-18

## 2020-01-12 RX ORDER — HEPARIN SODIUM 5000 [USP'U]/ML
5000 INJECTION INTRAVENOUS; SUBCUTANEOUS EVERY 12 HOURS
Refills: 0 | Status: DISCONTINUED | OUTPATIENT
Start: 2020-01-12 | End: 2020-01-12

## 2020-01-12 RX ORDER — OXYCODONE AND ACETAMINOPHEN 5; 325 MG/1; MG/1
1 TABLET ORAL ONCE
Refills: 0 | Status: DISCONTINUED | OUTPATIENT
Start: 2020-01-12 | End: 2020-01-12

## 2020-01-12 RX ORDER — INSULIN LISPRO 100/ML
5 VIAL (ML) SUBCUTANEOUS
Refills: 0 | Status: DISCONTINUED | OUTPATIENT
Start: 2020-01-12 | End: 2020-01-12

## 2020-01-12 RX ORDER — TICAGRELOR 90 MG/1
90 TABLET ORAL EVERY 12 HOURS
Refills: 0 | Status: DISCONTINUED | OUTPATIENT
Start: 2020-01-13 | End: 2020-01-12

## 2020-01-12 RX ORDER — SIMETHICONE 80 MG/1
80 TABLET, CHEWABLE ORAL ONCE
Refills: 0 | Status: COMPLETED | OUTPATIENT
Start: 2020-01-12 | End: 2020-01-12

## 2020-01-12 RX ORDER — ASPIRIN/CALCIUM CARB/MAGNESIUM 324 MG
81 TABLET ORAL DAILY
Refills: 0 | Status: DISCONTINUED | OUTPATIENT
Start: 2020-01-12 | End: 2020-01-12

## 2020-01-12 RX ORDER — TAMSULOSIN HYDROCHLORIDE 0.4 MG/1
0.4 CAPSULE ORAL AT BEDTIME
Refills: 0 | Status: DISCONTINUED | OUTPATIENT
Start: 2020-01-12 | End: 2020-01-12

## 2020-01-12 RX ORDER — HYDROMORPHONE HYDROCHLORIDE 2 MG/ML
0.5 INJECTION INTRAMUSCULAR; INTRAVENOUS; SUBCUTANEOUS
Refills: 0 | Status: DISCONTINUED | OUTPATIENT
Start: 2020-01-12 | End: 2020-01-12

## 2020-01-12 RX ORDER — HYDROMORPHONE HYDROCHLORIDE 2 MG/ML
1 INJECTION INTRAMUSCULAR; INTRAVENOUS; SUBCUTANEOUS
Refills: 0 | Status: DISCONTINUED | OUTPATIENT
Start: 2020-01-12 | End: 2020-01-12

## 2020-01-12 RX ORDER — DEXTROSE 50 % IN WATER 50 %
12.5 SYRINGE (ML) INTRAVENOUS ONCE
Refills: 0 | Status: DISCONTINUED | OUTPATIENT
Start: 2020-01-12 | End: 2020-01-12

## 2020-01-12 RX ORDER — MORPHINE SULFATE 50 MG/1
4 CAPSULE, EXTENDED RELEASE ORAL EVERY 4 HOURS
Refills: 0 | Status: DISCONTINUED | OUTPATIENT
Start: 2020-01-12 | End: 2020-01-12

## 2020-01-12 RX ORDER — DEXTROSE 50 % IN WATER 50 %
15 SYRINGE (ML) INTRAVENOUS ONCE
Refills: 0 | Status: DISCONTINUED | OUTPATIENT
Start: 2020-01-12 | End: 2020-01-12

## 2020-01-12 RX ORDER — INSULIN GLARGINE 100 [IU]/ML
8 INJECTION, SOLUTION SUBCUTANEOUS AT BEDTIME
Refills: 0 | Status: DISCONTINUED | OUTPATIENT
Start: 2020-01-12 | End: 2020-01-12

## 2020-01-12 RX ADMIN — Medication 81 MILLIGRAM(S): at 16:16

## 2020-01-12 RX ADMIN — MORPHINE SULFATE 4 MILLIGRAM(S): 50 CAPSULE, EXTENDED RELEASE ORAL at 04:30

## 2020-01-12 RX ADMIN — MORPHINE SULFATE 4 MILLIGRAM(S): 50 CAPSULE, EXTENDED RELEASE ORAL at 00:40

## 2020-01-12 RX ADMIN — SIMETHICONE 80 MILLIGRAM(S): 80 TABLET, CHEWABLE ORAL at 06:43

## 2020-01-12 RX ADMIN — AMLODIPINE BESYLATE 5 MILLIGRAM(S): 2.5 TABLET ORAL at 05:17

## 2020-01-12 RX ADMIN — MORPHINE SULFATE 4 MILLIGRAM(S): 50 CAPSULE, EXTENDED RELEASE ORAL at 04:14

## 2020-01-12 RX ADMIN — TICAGRELOR 60 MILLIGRAM(S): 90 TABLET ORAL at 05:18

## 2020-01-12 RX ADMIN — MORPHINE SULFATE 4 MILLIGRAM(S): 50 CAPSULE, EXTENDED RELEASE ORAL at 00:10

## 2020-01-12 RX ADMIN — SODIUM CHLORIDE 100 MILLILITER(S): 9 INJECTION, SOLUTION INTRAVENOUS at 12:18

## 2020-01-12 RX ADMIN — HEPARIN SODIUM 5000 UNIT(S): 5000 INJECTION INTRAVENOUS; SUBCUTANEOUS at 06:01

## 2020-01-12 NOTE — DISCHARGE NOTE PROVIDER - CARE PROVIDER_API CALL
Jann Cai)  [facility]  [address] [place] [address], [address]  [place] [address]  Phone: [phone]  Fax: [phone]  Follow Up Time: 1 week

## 2020-01-12 NOTE — DISCHARGE NOTE PROVIDER - NSDCCPCAREPLAN_GEN_ALL_CORE_FT
PRINCIPAL DISCHARGE DIAGNOSIS  Diagnosis: Kidney stone  Assessment and Plan of Treatment: A stent was placed by the Urologists to help with the obstuction. Please drink plenty of fluids to help with passing of the stone.  Kidney stones (urolithiasis) are deposits that form inside your kidneys. The intense pain is caused by the stone moving through the urinary tract. When the stone moves, the ureter goes into spasm around the stone. The stone is usually passed in the urine. Symptoms include abdominal, side, or back pain, nausea, vomiting, blood in the urine, frequency with urination. Drink enough water and fluids to keep your urine clear or pale yellow. This will help you to pass the stone or stone fragments.  SEEK IMMEDIATE MEDICAL CARE IF YOU HAVE THE FOLLOWING SYMPTOMS: pain not controlled with medication, fever/chills, worsening vomiting, inability to urinate, or dizziness/lightheadedness.        SECONDARY DISCHARGE DIAGNOSES  Diagnosis: Hydronephrosis  Assessment and Plan of Treatment:

## 2020-01-12 NOTE — DISCHARGE NOTE PROVIDER - HOSPITAL COURSE
60 y/o male with hx of HTN, DM, brain aneurysm s/p coiling on Brilinta and nephrolithiasis requiring multiple procedures in the past including stent placements and lithotripsy. Presents today with 1 day hx of colicky R flank pain radiating to R groin. Deneis any fevers, chills, vomiting. Denies dysuria, hematuria or difficulty voiding but is experiencing increased frequency. In the Ed pt underwent CT scan which was consistent with Right sided hydro along with urolithiasis. Pt was evaluated by urology in ED.     Ct showed obstuctive uropathy with hydronephrosis. Pt was addon for cytoscopy and got a stent placed. Post procedure cleared by Urology. pt stable and ready for d/c.

## 2020-01-12 NOTE — DISCHARGE NOTE PROVIDER - NSDCPNSUBOBJ_GEN_ALL_CORE
<<<RESIDENT DISCHARGE NOTE>>>         TRACY TOSCANO    MRN-[id number]        VITAL SIGNS:    T(F): 97.6 (01-12-20 @ 11:50), Max: 97.9 (01-11-20 @ 14:55)    HR: 76 (01-12-20 @ 12:30)    BP: 144/82 (01-12-20 @ 12:30)    SpO2: 100% (01-12-20 @ 12:30)    Weight (kg): 91.6 (01-12-20 @ 10:17)    BMI (kg/m2): 31.6 (01-12-20 @ 10:17)        PHYSICAL EXAMINATION:    	GENERAL: NAD, well-developed    	HEAD:  Atraumatic, Normocephalic    	EYES: EOMI, PERRLA, conjunctiva and sclera clear    	NECK: Supple, No JVD    	CHEST/LUNG: Clear to auscultation bilaterally; No wheeze    	HEART: Regular rate and rhythm; No murmurs, rubs, or gallops    	ABDOMEN: soft nontender nondistended     	EXTREMITIES:  2+ Peripheral Pulses, No clubbing, cyanosis, or edema    	PSYCH: AAOx3    	NEUROLOGY: non-focal    SKIN: No rashes or lesions            TEST RESULTS:                            11.5     5.76  )-----------( 147      ( 12 Jan 2020 06:51 )               33.0             01-12        138  |  101  |  13    ----------------------------<  148<H>    4.0   |  22  |  0.8        Ca    9.2      12 Jan 2020 06:51        TPro  7.7  /  Alb  4.6  /  TBili  0.3  /  DBili  <0.2  /  AST  22  /  ALT  34  /  AlkPhos  82  01-11            FINAL DISCHARGE INTERVIEW:  Resident(s) Present: (Name:______Stephanie_______)        DISCHARGE MEDICATION RECONCILIATION  reviewed with Attending (Name:______Renuka_____)        DISPOSITION:   [  ] Home,    [  ] Home with Visiting Nursing Services,   [    ]  SNF/ NH,    [   ] Acute Rehab (4A),   [   ] Other (Specify:_________)

## 2020-01-12 NOTE — DISCHARGE NOTE NURSING/CASE MANAGEMENT/SOCIAL WORK - PATIENT PORTAL LINK FT
You can access the FollowMyHealth Patient Portal offered by [facility] by registering at the following website: [email or web address]. By joining [facility]’s FollowMyHealth portal, you will also be able to view your health information using other applications (apps) compatible with our system.

## 2020-01-12 NOTE — DISCHARGE NOTE PROVIDER - NSDCMRMEDTOKEN_GEN_ALL_CORE_FT
Aspir 81 oral delayed release tablet: 1 tab(s) orally once a day  atorvastatin 40 mg oral tablet: 1 tab(s) orally once a day  Brilinta (ticagrelor) 60 mg oral tablet: 1 tab(s) orally 2 times a day amLODIPine 5 mg oral tablet: 1 tab(s) orally once a day  Aspir 81 oral delayed release tablet: 1 tab(s) orally once a day  atorvastatin 40 mg oral tablet: 1 tab(s) orally once a day  Brilinta (ticagrelor) 60 mg oral tablet: 1 tab(s) orally 2 times a day

## 2020-01-13 LAB
CULTURE RESULTS: SIGNIFICANT CHANGE UP
ESTIMATED AVERAGE GLUCOSE: 160 MG/DL — HIGH (ref 68–114)
HBA1C BLD-MCNC: 7.2 % — HIGH (ref 4–5.6)
HCV AB S/CO SERPL IA: 0.2 S/CO — SIGNIFICANT CHANGE UP (ref 0–0.99)
HCV AB SERPL-IMP: SIGNIFICANT CHANGE UP
SPECIMEN SOURCE: SIGNIFICANT CHANGE UP

## 2020-01-14 PROBLEM — E78.00 PURE HYPERCHOLESTEROLEMIA, UNSPECIFIED: Chronic | Status: ACTIVE | Noted: 2020-01-11

## 2020-01-14 PROBLEM — N20.0 CALCULUS OF KIDNEY: Chronic | Status: ACTIVE | Noted: 2020-01-11

## 2020-01-14 PROBLEM — E11.9 TYPE 2 DIABETES MELLITUS WITHOUT COMPLICATIONS: Chronic | Status: ACTIVE | Noted: 2020-01-11

## 2020-01-14 PROBLEM — I10 ESSENTIAL (PRIMARY) HYPERTENSION: Chronic | Status: ACTIVE | Noted: 2020-01-11

## 2020-01-15 DIAGNOSIS — R10.9 UNSPECIFIED ABDOMINAL PAIN: ICD-10-CM

## 2020-01-15 DIAGNOSIS — Z87.898 PERSONAL HISTORY OF OTHER SPECIFIED CONDITIONS: ICD-10-CM

## 2020-01-15 DIAGNOSIS — E87.2 ACIDOSIS: ICD-10-CM

## 2020-01-15 DIAGNOSIS — N13.2 HYDRONEPHROSIS WITH RENAL AND URETERAL CALCULOUS OBSTRUCTION: ICD-10-CM

## 2020-01-15 DIAGNOSIS — E11.9 TYPE 2 DIABETES MELLITUS WITHOUT COMPLICATIONS: ICD-10-CM

## 2020-01-15 DIAGNOSIS — I10 ESSENTIAL (PRIMARY) HYPERTENSION: ICD-10-CM

## 2020-01-15 DIAGNOSIS — D53.9 NUTRITIONAL ANEMIA, UNSPECIFIED: ICD-10-CM

## 2020-01-15 DIAGNOSIS — Z98.890 OTHER SPECIFIED POSTPROCEDURAL STATES: ICD-10-CM

## 2020-01-21 ENCOUNTER — APPOINTMENT (OUTPATIENT)
Dept: UROLOGY | Facility: CLINIC | Age: 60
End: 2020-01-21
Payer: COMMERCIAL

## 2020-01-21 PROCEDURE — 99215 OFFICE O/P EST HI 40 MIN: CPT

## 2020-01-21 NOTE — PHYSICAL EXAM
[General Appearance - Well Developed] : well developed [General Appearance - Well Nourished] : well nourished [Normal Appearance] : normal appearance [Well Groomed] : well groomed [General Appearance - In No Acute Distress] : no acute distress [Abdomen Soft] : soft [Abdomen Tenderness] : non-tender [Costovertebral Angle Tenderness] : no ~M costovertebral angle tenderness [Urinary Bladder Findings] : the bladder was normal on palpation [Urethral Meatus] : meatus normal [Scrotum] : the scrotum was normal [Testes Mass (___cm)] : there were no testicular masses [Edema] : no peripheral edema [] : no respiratory distress [Respiration, Rhythm And Depth] : normal respiratory rhythm and effort [Exaggerated Use Of Accessory Muscles For Inspiration] : no accessory muscle use [Oriented To Time, Place, And Person] : oriented to person, place, and time [Affect] : the affect was normal [Mood] : the mood was normal [Not Anxious] : not anxious [No Focal Deficits] : no focal deficits [Normal Station and Gait] : the gait and station were normal for the patient's age [No Palpable Adenopathy] : no palpable adenopathy [FreeTextEntry1] : right inguinal hernia scar, no lymphadenopathy

## 2020-01-21 NOTE — ASSESSMENT
[FreeTextEntry1] : TRACY TOSCANO is a 59 year old male hx b/l IHR ~2010 who presents with multiple right ureteral and b/l non obstructing renal stones sp cystoscopy, right ureteral stent insertion 1/12/19, 2 cm right anterior bladder/groin soft tissue nodule.\par Plan for RIGHT ureteroscopy, laser lithotripsy, ureteral stent exchange.\par Future LEFT ESWL \par Right soft tissue nodule likely related to prior hernia surgery however will obtain f/u CT April 2020 confirm stability\par \par \par Our stone treatment discussion summarized--\par 1. Surveillance: we discussed risks associated with this approach including increase in size of stone, UTIs, renal obstruction.\par \par 2. URS/LL: we discussed how this is done (transurethrally with small cameras, baskets and a laser), the risks (bleeding, infection, damage to  organs, stricture, inability to access the ureter, stent pain which can be mild, moderate or severe) and benefits (minimally invasive). The patient also understands that if our scopes will not fit into the ureter because the ureter is too small, the patient will be stented and left to dilate with a stent ~2 weeks. We will then re-attempt the ureteroscopy. \par \par 3. ESWL: we discussed how this procedure is performed (transcorporeal shock waves under light anesthesia), the risks (bleeding, failure to fragment stones, steinstrasse) and benefits (least invasive technique). \par \par 4. PCNL: we discussed what this procedure entails (tube into the kidney through the back with destruction of the stone via this tube) and the risks (which include bleeding that may or may not require embolization, infection and damage to surrounding organs, stricture, need for additional surgeries). This method is preferable for large stones with the highest stone free-rates and it is the most invasive. This surgery would require patient to have a nephrostomy tube post-operatively.\par \par For these treatment, we also discussed the possible need for additional therapies for persistent stone burden. \par We stressed that when ureteral stents are inserted they are temporary and must be removed within 3 months of placement. Otherwise encrustation, urosepsis, obstruction can occur.\par

## 2020-01-21 NOTE — HISTORY OF PRESENT ILLNESS
[FreeTextEntry1] : TRCAY TOSCANO is a 59 year old male hx b/l IHR ~2010 who presents with multiple right ureteral and b/l non obstructing renal stones sp cystoscopy, right ureteral stent insertion 1/12/19.\par \par Admitted to the hospital with severe intermittent right flank pain and was found to have the ureteral stone. pain is now improved with ureteral stent insertion. Denies gross hematuria, dysuria or associated symptoms. Denies pelvic pain.\par CT abdomen and pelvis 1/2019 images visualized and there is a 1.1 cm proximal right ureteral stone along with a second 7 mm distal right ureteral stone. Visible on . +hydro.\par There are bilateral nonobstructing stones in the kidneys (max 4 mm right, 5 mm left)\par There is also a nonspecific 2.3 cm soft tissue density anterior to the urinary bladder and right groin\par \par Denies  PMH including previous kidney stones, recurrent UTIs. \par Family History: No  malignancies\par Soc hx window repair\par \par Cr 0.8 1/2020\par Old records reviewed including imaging 2010 CT a/p and there was no soft tissue right groin nodule however this was likely pre IHR. Small left non obstructing renal stone

## 2020-01-27 ENCOUNTER — APPOINTMENT (OUTPATIENT)
Dept: CARDIOLOGY | Facility: CLINIC | Age: 60
End: 2020-01-27
Payer: COMMERCIAL

## 2020-01-27 VITALS
DIASTOLIC BLOOD PRESSURE: 90 MMHG | SYSTOLIC BLOOD PRESSURE: 140 MMHG | HEIGHT: 67 IN | BODY MASS INDEX: 31.08 KG/M2 | HEART RATE: 73 BPM | WEIGHT: 198 LBS

## 2020-01-27 DIAGNOSIS — F17.200 NICOTINE DEPENDENCE, UNSPECIFIED, UNCOMPLICATED: ICD-10-CM

## 2020-01-27 DIAGNOSIS — F41.9 ANXIETY DISORDER, UNSPECIFIED: ICD-10-CM

## 2020-01-27 PROCEDURE — 99214 OFFICE O/P EST MOD 30 MIN: CPT

## 2020-01-27 PROCEDURE — 93000 ELECTROCARDIOGRAM COMPLETE: CPT

## 2020-01-27 RX ORDER — BUPROPION HYDROCHLORIDE 150 MG/1
150 TABLET, FILM COATED, EXTENDED RELEASE ORAL DAILY
Qty: 30 | Refills: 1 | Status: DISCONTINUED | COMMUNITY
Start: 2019-03-28 | End: 2020-01-27

## 2020-01-27 RX ORDER — SERTRALINE HYDROCHLORIDE 50 MG/1
50 TABLET, FILM COATED ORAL DAILY
Refills: 0 | Status: DISCONTINUED | COMMUNITY
End: 2020-01-27

## 2020-01-27 NOTE — PHYSICAL EXAM
[General Appearance - Well Developed] : well developed [Normal Appearance] : normal appearance [Well Groomed] : well groomed [General Appearance - Well Nourished] : well nourished [No Deformities] : no deformities [General Appearance - In No Acute Distress] : no acute distress [Normal Conjunctiva] : the conjunctiva exhibited no abnormalities [Eyelids - No Xanthelasma] : the eyelids demonstrated no xanthelasmas [Normal Oral Mucosa] : normal oral mucosa [No Oral Pallor] : no oral pallor [No Oral Cyanosis] : no oral cyanosis [Normal Jugular Venous A Waves Present] : normal jugular venous A waves present [Normal Jugular Venous V Waves Present] : normal jugular venous V waves present [No Jugular Venous Hatch A Waves] : no jugular venous hatch A waves [] : no respiratory distress [Respiration, Rhythm And Depth] : normal respiratory rhythm and effort [Exaggerated Use Of Accessory Muscles For Inspiration] : no accessory muscle use [Auscultation Breath Sounds / Voice Sounds] : lungs were clear to auscultation bilaterally [Normal Rate] : normal [Rhythm Regular] : regular [2+] : left 2+ [No Pitting Edema] : no pitting edema present

## 2020-01-27 NOTE — HISTORY OF PRESENT ILLNESS
[FreeTextEntry1] : The patient has extensive nephrolithiasis . The patient has a urethral stent. The patient is to undergo transurethral  laser procedure followed by lithotripsy . The patient has a history of having angina. His last stress test was about 5 years ago according to the patient and he never had a cardiac cath performed. He was found to have a cerebral aneurysm in 11-19 . He had a coil procedure done at this time and was placed on Brillinta and ASA . He had good exercise tolerance . He ia able to walk up a flight of stairs without issues .

## 2020-01-27 NOTE — ASSESSMENT
[FreeTextEntry1] : The patient has ? hx of having angina more than 4 years ago but no history of MI or recent symptoms . He is going for transureter laser and stent removal on the right side for extensive renal stones . The patient has HTN DM hyperlipidemia . No history of MI or CHF . He ureter stent recently without issues . The patient had a intracerebral coiling of an aneurysm and stent placement in 11-19  He is on DAPT for this including Brilinta  The patient would have to get permission to interrupt the Brilinta and ASA by his neurosurgeon prior to the procedure. He would separate clearance for this from his neurologist or neurosurgeon . From the cardiac point of view he is an intermediate cardiac risk undergoing a minor to intermediate risk procedure. He has more than 4 METS exercise capacity .

## 2020-01-31 ENCOUNTER — OUTPATIENT (OUTPATIENT)
Dept: OUTPATIENT SERVICES | Facility: HOSPITAL | Age: 60
LOS: 1 days | Discharge: HOME | End: 2020-01-31
Payer: COMMERCIAL

## 2020-01-31 VITALS
WEIGHT: 199.08 LBS | OXYGEN SATURATION: 96 % | HEIGHT: 67 IN | TEMPERATURE: 98 F | HEART RATE: 84 BPM | RESPIRATION RATE: 16 BRPM | SYSTOLIC BLOOD PRESSURE: 162 MMHG | DIASTOLIC BLOOD PRESSURE: 76 MMHG

## 2020-01-31 DIAGNOSIS — Z01.818 ENCOUNTER FOR OTHER PREPROCEDURAL EXAMINATION: ICD-10-CM

## 2020-01-31 DIAGNOSIS — N20.0 CALCULUS OF KIDNEY: Chronic | ICD-10-CM

## 2020-01-31 DIAGNOSIS — N20.0 CALCULUS OF KIDNEY: ICD-10-CM

## 2020-01-31 DIAGNOSIS — Z98.890 OTHER SPECIFIED POSTPROCEDURAL STATES: Chronic | ICD-10-CM

## 2020-01-31 LAB
ALBUMIN SERPL ELPH-MCNC: 4.6 G/DL — SIGNIFICANT CHANGE UP (ref 3.5–5.2)
ALP SERPL-CCNC: 71 U/L — SIGNIFICANT CHANGE UP (ref 30–115)
ALT FLD-CCNC: 43 U/L — HIGH (ref 0–41)
ANION GAP SERPL CALC-SCNC: 15 MMOL/L — HIGH (ref 7–14)
APPEARANCE UR: CLEAR — SIGNIFICANT CHANGE UP
APTT BLD: 27.1 SEC — SIGNIFICANT CHANGE UP (ref 27–39.2)
AST SERPL-CCNC: 30 U/L — SIGNIFICANT CHANGE UP (ref 0–41)
BACTERIA # UR AUTO: ABNORMAL /HPF
BASOPHILS # BLD AUTO: 0.05 K/UL — SIGNIFICANT CHANGE UP (ref 0–0.2)
BASOPHILS NFR BLD AUTO: 0.7 % — SIGNIFICANT CHANGE UP (ref 0–1)
BILIRUB SERPL-MCNC: 0.3 MG/DL — SIGNIFICANT CHANGE UP (ref 0.2–1.2)
BILIRUB UR-MCNC: ABNORMAL
BUN SERPL-MCNC: 15 MG/DL — SIGNIFICANT CHANGE UP (ref 10–20)
CALCIUM SERPL-MCNC: 10.4 MG/DL — HIGH (ref 8.5–10.1)
CHLORIDE SERPL-SCNC: 100 MMOL/L — SIGNIFICANT CHANGE UP (ref 98–110)
CO2 SERPL-SCNC: 25 MMOL/L — SIGNIFICANT CHANGE UP (ref 17–32)
COLOR SPEC: ABNORMAL
CREAT SERPL-MCNC: 0.7 MG/DL — SIGNIFICANT CHANGE UP (ref 0.7–1.5)
DIFF PNL FLD: ABNORMAL
EOSINOPHIL # BLD AUTO: 0.26 K/UL — SIGNIFICANT CHANGE UP (ref 0–0.7)
EOSINOPHIL NFR BLD AUTO: 3.5 % — SIGNIFICANT CHANGE UP (ref 0–8)
GLUCOSE SERPL-MCNC: 230 MG/DL — HIGH (ref 70–99)
GLUCOSE UR QL: NEGATIVE — SIGNIFICANT CHANGE UP
HCT VFR BLD CALC: 36.1 % — LOW (ref 42–52)
HGB BLD-MCNC: 12.5 G/DL — LOW (ref 14–18)
IMM GRANULOCYTES NFR BLD AUTO: 0.5 % — HIGH (ref 0.1–0.3)
INR BLD: 1.01 RATIO — SIGNIFICANT CHANGE UP (ref 0.65–1.3)
INR PPP: 1.7 RATIO
KETONES UR-MCNC: NEGATIVE — SIGNIFICANT CHANGE UP
LEUKOCYTE ESTERASE UR-ACNC: NEGATIVE — SIGNIFICANT CHANGE UP
LYMPHOCYTES # BLD AUTO: 2.07 K/UL — SIGNIFICANT CHANGE UP (ref 1.2–3.4)
LYMPHOCYTES # BLD AUTO: 28.2 % — SIGNIFICANT CHANGE UP (ref 20.5–51.1)
MCHC RBC-ENTMCNC: 31.7 PG — HIGH (ref 27–31)
MCHC RBC-ENTMCNC: 34.6 G/DL — SIGNIFICANT CHANGE UP (ref 32–37)
MCV RBC AUTO: 91.6 FL — SIGNIFICANT CHANGE UP (ref 80–94)
MONOCYTES # BLD AUTO: 0.54 K/UL — SIGNIFICANT CHANGE UP (ref 0.1–0.6)
MONOCYTES NFR BLD AUTO: 7.3 % — SIGNIFICANT CHANGE UP (ref 1.7–9.3)
NEUTROPHILS # BLD AUTO: 4.39 K/UL — SIGNIFICANT CHANGE UP (ref 1.4–6.5)
NEUTROPHILS NFR BLD AUTO: 59.8 % — SIGNIFICANT CHANGE UP (ref 42.2–75.2)
NITRITE UR-MCNC: POSITIVE
NRBC # BLD: 0 /100 WBCS — SIGNIFICANT CHANGE UP (ref 0–0)
PH UR: 6 — SIGNIFICANT CHANGE UP (ref 5–8)
PLATELET # BLD AUTO: 204 K/UL — SIGNIFICANT CHANGE UP (ref 130–400)
POCT-PROTHROMBIN TIME: 20.5 SECS
POTASSIUM SERPL-MCNC: 4.5 MMOL/L — SIGNIFICANT CHANGE UP (ref 3.5–5)
POTASSIUM SERPL-SCNC: 4.5 MMOL/L — SIGNIFICANT CHANGE UP (ref 3.5–5)
PROT SERPL-MCNC: 7.7 G/DL — SIGNIFICANT CHANGE UP (ref 6–8)
PROT UR-MCNC: 100
PROTHROM AB SERPL-ACNC: 11.6 SEC — SIGNIFICANT CHANGE UP (ref 9.95–12.87)
QUALITY CONTROL: YES
RBC # BLD: 3.94 M/UL — LOW (ref 4.7–6.1)
RBC # FLD: 12.4 % — SIGNIFICANT CHANGE UP (ref 11.5–14.5)
RBC CASTS # UR COMP ASSIST: >50 /HPF
SODIUM SERPL-SCNC: 140 MMOL/L — SIGNIFICANT CHANGE UP (ref 135–146)
SP GR SPEC: 1.02 — SIGNIFICANT CHANGE UP (ref 1.01–1.03)
UROBILINOGEN FLD QL: 2 (ref 0.2–0.2)
WBC # BLD: 7.35 K/UL — SIGNIFICANT CHANGE UP (ref 4.8–10.8)
WBC # FLD AUTO: 7.35 K/UL — SIGNIFICANT CHANGE UP (ref 4.8–10.8)
WBC UR QL: SIGNIFICANT CHANGE UP /HPF

## 2020-01-31 PROCEDURE — 71046 X-RAY EXAM CHEST 2 VIEWS: CPT | Mod: 26

## 2020-01-31 PROCEDURE — 93010 ELECTROCARDIOGRAM REPORT: CPT

## 2020-01-31 RX ORDER — TICAGRELOR 90 MG/1
1 TABLET ORAL
Qty: 0 | Refills: 0 | DISCHARGE

## 2020-01-31 NOTE — H&P PST ADULT - REASON FOR ADMISSION
59 y.o. male scheduled for cystoscopy right ureteroscopy laser lithotripsy ureteral stent exchange with Dr. Cai

## 2020-01-31 NOTE — H&P PST ADULT - HISTORY OF PRESENT ILLNESS
PT has brain stent placed by Dr. Schneider; he will be seeing him next week monday--pt had a stroke and brain aneurysm was found     PT uses CPAP     Brillinta was stopped and patient started plavix; currently on baby ASA     No issues with anesthesia in past; denies bleeding disorder     FROM    Mallampati 3    Denies CP, SOB, heart palpitations in past 2 weeks  -pt's Urine is red he's on pyridium; he has burning from passing stones    Last CT A/P was 01/11/20    Last A1C was 7.2 (01/12/20)

## 2020-01-31 NOTE — H&P PST ADULT - NSICDXPASTMEDICALHX_GEN_ALL_CORE_FT
PAST MEDICAL HISTORY:  Brain aneurysm     Diabetes     High cholesterol     HTN (hypertension)     Kidney stones

## 2020-01-31 NOTE — H&P PST ADULT - FUNCTIONAL SCREEN CURRENT LEVEL: TRANSFERRING, MLM
+reducible umbilical hernia. Abdomen soft, non-tender and non-distended, no rebound, no guarding and no masses. no hepatosplenomegaly.
0 = independent

## 2020-02-02 ENCOUNTER — TRANSCRIPTION ENCOUNTER (OUTPATIENT)
Age: 60
End: 2020-02-02

## 2020-02-03 LAB
-  AMIKACIN: SIGNIFICANT CHANGE UP
-  AMPICILLIN/SULBACTAM: SIGNIFICANT CHANGE UP
-  AMPICILLIN: SIGNIFICANT CHANGE UP
-  AMPICILLIN: SIGNIFICANT CHANGE UP
-  AZTREONAM: SIGNIFICANT CHANGE UP
-  CEFAZOLIN: SIGNIFICANT CHANGE UP
-  CEFEPIME: SIGNIFICANT CHANGE UP
-  CEFOXITIN: SIGNIFICANT CHANGE UP
-  CEFTRIAXONE: SIGNIFICANT CHANGE UP
-  CIPROFLOXACIN: SIGNIFICANT CHANGE UP
-  CIPROFLOXACIN: SIGNIFICANT CHANGE UP
-  GENTAMICIN: SIGNIFICANT CHANGE UP
-  LEVOFLOXACIN: SIGNIFICANT CHANGE UP
-  LEVOFLOXACIN: SIGNIFICANT CHANGE UP
-  MEROPENEM: SIGNIFICANT CHANGE UP
-  NITROFURANTOIN: SIGNIFICANT CHANGE UP
-  NITROFURANTOIN: SIGNIFICANT CHANGE UP
-  PIPERACILLIN/TAZOBACTAM: SIGNIFICANT CHANGE UP
-  TETRACYCLINE: SIGNIFICANT CHANGE UP
-  TOBRAMYCIN: SIGNIFICANT CHANGE UP
-  TRIMETHOPRIM/SULFAMETHOXAZOLE: SIGNIFICANT CHANGE UP
-  VANCOMYCIN: SIGNIFICANT CHANGE UP
CULTURE RESULTS: SIGNIFICANT CHANGE UP
METHOD TYPE: SIGNIFICANT CHANGE UP
METHOD TYPE: SIGNIFICANT CHANGE UP
ORGANISM # SPEC MICROSCOPIC CNT: SIGNIFICANT CHANGE UP
SPECIMEN SOURCE: SIGNIFICANT CHANGE UP

## 2020-02-04 ENCOUNTER — APPOINTMENT (OUTPATIENT)
Dept: CARDIOLOGY | Facility: CLINIC | Age: 60
End: 2020-02-04
Payer: COMMERCIAL

## 2020-02-04 PROCEDURE — 93306 TTE W/DOPPLER COMPLETE: CPT

## 2020-02-05 PROBLEM — I67.1 CEREBRAL ANEURYSM, NONRUPTURED: Chronic | Status: ACTIVE | Noted: 2020-01-31

## 2020-02-05 NOTE — PROGRESS NOTE ADULT - SUBJECTIVE AND OBJECTIVE BOX
PAST documents reviewed - MED. DIR. PAST - ANESTHESIA - as of this review : It was noted that the patient Cardiology Evaluation (  )  indicated the need for Neurology / Neurosurgery Evaluation and Risk Assessment due to the fact that the dual antiplatelet medications are necessary 2/2  to the recent cerebral Arterial Aneurysm Coiling ( [facility] ) In order to prevent stent thrombosis He acknowledges the bleeding risk may be slightly increased  *BUT , more importantly , " the risk of aneurysm/vessel thrombosis is relatively high without dual antiplatelets " .... He cannot discontinue either of the antiplatelet drugs . His Neuro f/u with necessary further testing is due 05/06/2020 ( 6 months post procedure ) .  I spoke personally by telephone with the Surgeon ,  . He feels it is not in the patient's best interest to postpone the surgery for that length of time . He , therefore , will proceed with the planned Urologic procedure while still on the 2 Antiplatelet medications , taking all necessary precautions to minimize the bleeding risk . PAST documents reviewed - MED. DIR. PAST - ANESTHESIA - as of this review : It was noted that the patient Cardiology Evaluation (  )  indicated the need for Neurology / Neurosurgery Evaluation and Risk Assessment due to the fact that the dual antiplatelet medications are necessary 2/2  to the recent cerebral Arterial Aneurysm Coiling ( [facility] on the Left ) In order to prevent stent thrombosis He acknowledges the bleeding risk may be slightly increased  *BUT , more importantly , " the risk of aneurysm/vessel thrombosis is relatively high without dual antiplatelets " .... He cannot discontinue either of the antiplatelet drugs . His Neuro f/u with necessary further testing is due 05/06/2020 ( 6 months post procedure ) .  I spoke personally by telephone with the Surgeon ,  . He feels it is not in the patient's best interest to postpone the surgery for that length of time . He , therefore , will proceed with the planned Urologic procedure while still on the 2 Antiplatelet medications , taking all necessary precautions to minimize the bleeding risk .

## 2020-02-06 NOTE — ASU PATIENT PROFILE, ADULT - PSH
History of hernia repair  umbilical & b/l inguinal  History of surgery  brain aneurysm repair with stent & coil  Kidney stone  lithotripsy & eswal

## 2020-02-06 NOTE — ASU PATIENT PROFILE, ADULT - PMH
Brain aneurysm    Diabetes    High cholesterol    History of medical problems  former smoker quit 11/2019, yazan  HTN (hypertension)    Kidney stones

## 2020-02-07 ENCOUNTER — APPOINTMENT (OUTPATIENT)
Dept: UROLOGY | Facility: HOSPITAL | Age: 60
End: 2020-02-07
Payer: COMMERCIAL

## 2020-02-07 ENCOUNTER — OUTPATIENT (OUTPATIENT)
Dept: OUTPATIENT SERVICES | Facility: HOSPITAL | Age: 60
LOS: 1 days | Discharge: HOME | End: 2020-02-07

## 2020-02-07 VITALS — RESPIRATION RATE: 18 BRPM | DIASTOLIC BLOOD PRESSURE: 74 MMHG | SYSTOLIC BLOOD PRESSURE: 142 MMHG | HEART RATE: 58 BPM

## 2020-02-07 VITALS
SYSTOLIC BLOOD PRESSURE: 130 MMHG | HEART RATE: 69 BPM | TEMPERATURE: 97 F | DIASTOLIC BLOOD PRESSURE: 78 MMHG | OXYGEN SATURATION: 96 % | HEIGHT: 67 IN | WEIGHT: 197.98 LBS | RESPIRATION RATE: 18 BRPM

## 2020-02-07 DIAGNOSIS — N20.0 CALCULUS OF KIDNEY: Chronic | ICD-10-CM

## 2020-02-07 DIAGNOSIS — Z98.890 OTHER SPECIFIED POSTPROCEDURAL STATES: Chronic | ICD-10-CM

## 2020-02-07 LAB — GLUCOSE BLDC GLUCOMTR-MCNC: 198 MG/DL — HIGH (ref 70–99)

## 2020-02-07 PROCEDURE — 52356 CYSTO/URETERO W/LITHOTRIPSY: CPT | Mod: RT

## 2020-02-07 PROCEDURE — 52353 CYSTOURETERO W/LITHOTRIPSY: CPT | Mod: 59,RT

## 2020-02-07 RX ORDER — ONDANSETRON 8 MG/1
4 TABLET, FILM COATED ORAL ONCE
Refills: 0 | Status: DISCONTINUED | OUTPATIENT
Start: 2020-02-07 | End: 2020-02-22

## 2020-02-07 RX ORDER — HYDROMORPHONE HYDROCHLORIDE 2 MG/ML
1 INJECTION INTRAMUSCULAR; INTRAVENOUS; SUBCUTANEOUS
Refills: 0 | Status: DISCONTINUED | OUTPATIENT
Start: 2020-02-07 | End: 2020-02-07

## 2020-02-07 RX ORDER — SODIUM CHLORIDE 9 MG/ML
1000 INJECTION, SOLUTION INTRAVENOUS
Refills: 0 | Status: DISCONTINUED | OUTPATIENT
Start: 2020-02-07 | End: 2020-02-22

## 2020-02-07 RX ORDER — HYDROMORPHONE HYDROCHLORIDE 2 MG/ML
0.5 INJECTION INTRAMUSCULAR; INTRAVENOUS; SUBCUTANEOUS
Refills: 0 | Status: DISCONTINUED | OUTPATIENT
Start: 2020-02-07 | End: 2020-02-07

## 2020-02-07 RX ORDER — OXYCODONE AND ACETAMINOPHEN 5; 325 MG/1; MG/1
2 TABLET ORAL ONCE
Refills: 0 | Status: DISCONTINUED | OUTPATIENT
Start: 2020-02-07 | End: 2020-02-07

## 2020-02-07 RX ADMIN — HYDROMORPHONE HYDROCHLORIDE 1 MILLIGRAM(S): 2 INJECTION INTRAMUSCULAR; INTRAVENOUS; SUBCUTANEOUS at 14:23

## 2020-02-07 RX ADMIN — HYDROMORPHONE HYDROCHLORIDE 1 MILLIGRAM(S): 2 INJECTION INTRAMUSCULAR; INTRAVENOUS; SUBCUTANEOUS at 14:38

## 2020-02-07 RX ADMIN — SODIUM CHLORIDE 100 MILLILITER(S): 9 INJECTION, SOLUTION INTRAVENOUS at 13:47

## 2020-02-07 NOTE — PRE-ANESTHESIA EVALUATION ADULT - NSANTHPMHFT_GEN_ALL_CORE
Chart reviewed, comprehensive evaluation and recommendation of Cardiology and Interventional Neurology  and their D/W Dr. Cai seen. Pt took both antiplatelet and metoprolol last night. Labs, EKG and Echo  report seen.  TIA before coiling, all resolved.

## 2020-02-07 NOTE — BRIEF OPERATIVE NOTE - OPERATION/FINDINGS
right ureteroscopy, laser lithotripsy, ureteral stent exchange of two right ureteral stones and two right renal stones. 6x26 JJ ureteral stent on a string.

## 2020-02-07 NOTE — ASU DISCHARGE PLAN (ADULT/PEDIATRIC) - ASU DC SPECIAL INSTRUCTIONSFT
You had a ureteral stent placed in your ureter to help keep the ureter open post operatively. We left the stent on a string. Please be careful when wiping so as to not dislodge the stent (can dab).  You can take tylenol as needed for pain control.   Please complete the antibiotic course which was also prescribed to your pharmacy pre-op. Save one antibiotic tablet for the day of stent removal.  's office will call you for a follow up appointment.

## 2020-02-07 NOTE — BRIEF OPERATIVE NOTE - NSICDXBRIEFPOSTOP_GEN_ALL_CORE_FT
POST-OP DIAGNOSIS:  Calculi, ureter 07-Feb-2020 13:30:41  Jann Cai  Renal stone 07-Feb-2020 13:30:36  Jann Cai

## 2020-02-07 NOTE — BRIEF OPERATIVE NOTE - NSICDXBRIEFPROCEDURE_GEN_ALL_CORE_FT
PROCEDURES:  Ureteroscopy, with laser lithotripsy and stent placement 07-Feb-2020 13:30:26  Jann Cai

## 2020-02-07 NOTE — BRIEF OPERATIVE NOTE - NSICDXBRIEFPREOP_GEN_ALL_CORE_FT
PRE-OP DIAGNOSIS:  Renal stone 07-Feb-2020 13:30:48  Jann Cai  Ureteric stone 07-Feb-2020 13:30:31  Jann Cai

## 2020-02-10 LAB — GLUCOSE BLDC GLUCOMTR-MCNC: 197 MG/DL — HIGH (ref 70–99)

## 2020-02-11 ENCOUNTER — EMERGENCY (EMERGENCY)
Facility: HOSPITAL | Age: 60
LOS: 0 days | Discharge: HOME | End: 2020-02-12
Attending: EMERGENCY MEDICINE | Admitting: EMERGENCY MEDICINE
Payer: COMMERCIAL

## 2020-02-11 ENCOUNTER — APPOINTMENT (OUTPATIENT)
Dept: UROLOGY | Facility: CLINIC | Age: 60
End: 2020-02-11
Payer: COMMERCIAL

## 2020-02-11 VITALS
HEIGHT: 67 IN | HEART RATE: 87 BPM | DIASTOLIC BLOOD PRESSURE: 99 MMHG | RESPIRATION RATE: 20 BRPM | OXYGEN SATURATION: 96 % | SYSTOLIC BLOOD PRESSURE: 207 MMHG | TEMPERATURE: 98 F

## 2020-02-11 VITALS
HEART RATE: 80 BPM | TEMPERATURE: 98 F | RESPIRATION RATE: 18 BRPM | SYSTOLIC BLOOD PRESSURE: 142 MMHG | OXYGEN SATURATION: 96 % | DIASTOLIC BLOOD PRESSURE: 79 MMHG

## 2020-02-11 DIAGNOSIS — N20.0 CALCULUS OF KIDNEY: ICD-10-CM

## 2020-02-11 DIAGNOSIS — N20.0 CALCULUS OF KIDNEY: Chronic | ICD-10-CM

## 2020-02-11 DIAGNOSIS — I10 ESSENTIAL (PRIMARY) HYPERTENSION: ICD-10-CM

## 2020-02-11 DIAGNOSIS — E11.9 TYPE 2 DIABETES MELLITUS WITHOUT COMPLICATIONS: ICD-10-CM

## 2020-02-11 DIAGNOSIS — R10.9 UNSPECIFIED ABDOMINAL PAIN: ICD-10-CM

## 2020-02-11 DIAGNOSIS — Z98.890 OTHER SPECIFIED POSTPROCEDURAL STATES: Chronic | ICD-10-CM

## 2020-02-11 PROBLEM — Z87.898 PERSONAL HISTORY OF OTHER SPECIFIED CONDITIONS: Chronic | Status: ACTIVE | Noted: 2020-02-07

## 2020-02-11 LAB
ALBUMIN SERPL ELPH-MCNC: 4.9 G/DL — SIGNIFICANT CHANGE UP (ref 3.5–5.2)
ALP SERPL-CCNC: 72 U/L — SIGNIFICANT CHANGE UP (ref 30–115)
ALT FLD-CCNC: 30 U/L — SIGNIFICANT CHANGE UP (ref 0–41)
ANION GAP SERPL CALC-SCNC: 16 MMOL/L — HIGH (ref 7–14)
AST SERPL-CCNC: 30 U/L — SIGNIFICANT CHANGE UP (ref 0–41)
BASOPHILS # BLD AUTO: 0.05 K/UL — SIGNIFICANT CHANGE UP (ref 0–0.2)
BASOPHILS NFR BLD AUTO: 0.5 % — SIGNIFICANT CHANGE UP (ref 0–1)
BILIRUB SERPL-MCNC: 0.3 MG/DL — SIGNIFICANT CHANGE UP (ref 0.2–1.2)
BUN SERPL-MCNC: 19 MG/DL — SIGNIFICANT CHANGE UP (ref 10–20)
CALCIUM SERPL-MCNC: 10.2 MG/DL — HIGH (ref 8.5–10.1)
CHLORIDE SERPL-SCNC: 95 MMOL/L — LOW (ref 98–110)
CO2 SERPL-SCNC: 25 MMOL/L — SIGNIFICANT CHANGE UP (ref 17–32)
CREAT SERPL-MCNC: 1.1 MG/DL — SIGNIFICANT CHANGE UP (ref 0.7–1.5)
EOSINOPHIL # BLD AUTO: 0.15 K/UL — SIGNIFICANT CHANGE UP (ref 0–0.7)
EOSINOPHIL NFR BLD AUTO: 1.6 % — SIGNIFICANT CHANGE UP (ref 0–8)
GLUCOSE SERPL-MCNC: 222 MG/DL — HIGH (ref 70–99)
HCT VFR BLD CALC: 39.2 % — LOW (ref 42–52)
HGB BLD-MCNC: 13.3 G/DL — LOW (ref 14–18)
IMM GRANULOCYTES NFR BLD AUTO: 0.5 % — HIGH (ref 0.1–0.3)
LACTATE SERPL-SCNC: 3.1 MMOL/L — HIGH (ref 0.7–2)
LIDOCAIN IGE QN: 29 U/L — SIGNIFICANT CHANGE UP (ref 7–60)
LYMPHOCYTES # BLD AUTO: 1.57 K/UL — SIGNIFICANT CHANGE UP (ref 1.2–3.4)
LYMPHOCYTES # BLD AUTO: 16.6 % — LOW (ref 20.5–51.1)
MCHC RBC-ENTMCNC: 31.1 PG — HIGH (ref 27–31)
MCHC RBC-ENTMCNC: 33.9 G/DL — SIGNIFICANT CHANGE UP (ref 32–37)
MCV RBC AUTO: 91.6 FL — SIGNIFICANT CHANGE UP (ref 80–94)
MONOCYTES # BLD AUTO: 0.54 K/UL — SIGNIFICANT CHANGE UP (ref 0.1–0.6)
MONOCYTES NFR BLD AUTO: 5.7 % — SIGNIFICANT CHANGE UP (ref 1.7–9.3)
NEUTROPHILS # BLD AUTO: 7.07 K/UL — HIGH (ref 1.4–6.5)
NEUTROPHILS NFR BLD AUTO: 75.1 % — SIGNIFICANT CHANGE UP (ref 42.2–75.2)
NIDUS STONE QN: SIGNIFICANT CHANGE UP
NRBC # BLD: 0 /100 WBCS — SIGNIFICANT CHANGE UP (ref 0–0)
PLATELET # BLD AUTO: 192 K/UL — SIGNIFICANT CHANGE UP (ref 130–400)
POTASSIUM SERPL-MCNC: 4.4 MMOL/L — SIGNIFICANT CHANGE UP (ref 3.5–5)
POTASSIUM SERPL-SCNC: 4.4 MMOL/L — SIGNIFICANT CHANGE UP (ref 3.5–5)
PROT SERPL-MCNC: 8.1 G/DL — HIGH (ref 6–8)
RBC # BLD: 4.28 M/UL — LOW (ref 4.7–6.1)
RBC # FLD: 12.4 % — SIGNIFICANT CHANGE UP (ref 11.5–14.5)
SODIUM SERPL-SCNC: 136 MMOL/L — SIGNIFICANT CHANGE UP (ref 135–146)
WBC # BLD: 9.43 K/UL — SIGNIFICANT CHANGE UP (ref 4.8–10.8)
WBC # FLD AUTO: 9.43 K/UL — SIGNIFICANT CHANGE UP (ref 4.8–10.8)

## 2020-02-11 PROCEDURE — 99221 1ST HOSP IP/OBS SF/LOW 40: CPT

## 2020-02-11 PROCEDURE — 71046 X-RAY EXAM CHEST 2 VIEWS: CPT | Mod: 26

## 2020-02-11 PROCEDURE — 74177 CT ABD & PELVIS W/CONTRAST: CPT | Mod: 26

## 2020-02-11 PROCEDURE — 93010 ELECTROCARDIOGRAM REPORT: CPT

## 2020-02-11 PROCEDURE — 99285 EMERGENCY DEPT VISIT HI MDM: CPT

## 2020-02-11 PROCEDURE — 99214 OFFICE O/P EST MOD 30 MIN: CPT

## 2020-02-11 RX ORDER — MORPHINE SULFATE 50 MG/1
8 CAPSULE, EXTENDED RELEASE ORAL ONCE
Refills: 0 | Status: DISCONTINUED | OUTPATIENT
Start: 2020-02-11 | End: 2020-02-11

## 2020-02-11 RX ORDER — OXYCODONE AND ACETAMINOPHEN 5; 325 MG/1; MG/1
5-325 TABLET ORAL
Qty: 12 | Refills: 0 | Status: DISCONTINUED | COMMUNITY
Start: 2020-02-11 | End: 2020-02-11

## 2020-02-11 RX ORDER — ONDANSETRON 8 MG/1
4 TABLET, FILM COATED ORAL ONCE
Refills: 0 | Status: COMPLETED | OUTPATIENT
Start: 2020-02-11 | End: 2020-02-11

## 2020-02-11 RX ORDER — SODIUM CHLORIDE 9 MG/ML
1000 INJECTION, SOLUTION INTRAVENOUS ONCE
Refills: 0 | Status: COMPLETED | OUTPATIENT
Start: 2020-02-11 | End: 2020-02-11

## 2020-02-11 RX ORDER — KETOROLAC TROMETHAMINE 30 MG/ML
30 SYRINGE (ML) INJECTION ONCE
Refills: 0 | Status: DISCONTINUED | OUTPATIENT
Start: 2020-02-11 | End: 2020-02-11

## 2020-02-11 RX ORDER — SODIUM CHLORIDE 9 MG/ML
1000 INJECTION INTRAMUSCULAR; INTRAVENOUS; SUBCUTANEOUS ONCE
Refills: 0 | Status: COMPLETED | OUTPATIENT
Start: 2020-02-11 | End: 2020-02-11

## 2020-02-11 RX ORDER — TAMSULOSIN HYDROCHLORIDE 0.4 MG/1
0.4 CAPSULE ORAL ONCE
Refills: 0 | Status: COMPLETED | OUTPATIENT
Start: 2020-02-11 | End: 2020-02-11

## 2020-02-11 RX ORDER — HYDROMORPHONE HYDROCHLORIDE 2 MG/ML
0.5 INJECTION INTRAMUSCULAR; INTRAVENOUS; SUBCUTANEOUS ONCE
Refills: 0 | Status: DISCONTINUED | OUTPATIENT
Start: 2020-02-11 | End: 2020-02-11

## 2020-02-11 RX ORDER — IPRATROPIUM/ALBUTEROL SULFATE 18-103MCG
3 AEROSOL WITH ADAPTER (GRAM) INHALATION ONCE
Refills: 0 | Status: COMPLETED | OUTPATIENT
Start: 2020-02-11 | End: 2020-02-11

## 2020-02-11 RX ADMIN — MORPHINE SULFATE 8 MILLIGRAM(S): 50 CAPSULE, EXTENDED RELEASE ORAL at 20:35

## 2020-02-11 RX ADMIN — ONDANSETRON 4 MILLIGRAM(S): 8 TABLET, FILM COATED ORAL at 20:18

## 2020-02-11 RX ADMIN — SODIUM CHLORIDE 1000 MILLILITER(S): 9 INJECTION INTRAMUSCULAR; INTRAVENOUS; SUBCUTANEOUS at 20:18

## 2020-02-11 RX ADMIN — Medication 3 MILLILITER(S): at 20:19

## 2020-02-11 RX ADMIN — TAMSULOSIN HYDROCHLORIDE 0.4 MILLIGRAM(S): 0.4 CAPSULE ORAL at 21:32

## 2020-02-11 RX ADMIN — Medication 30 MILLIGRAM(S): at 22:37

## 2020-02-11 RX ADMIN — Medication 3 MILLILITER(S): at 20:23

## 2020-02-11 RX ADMIN — Medication 30 MILLIGRAM(S): at 22:55

## 2020-02-11 RX ADMIN — MORPHINE SULFATE 8 MILLIGRAM(S): 50 CAPSULE, EXTENDED RELEASE ORAL at 20:18

## 2020-02-11 RX ADMIN — HYDROMORPHONE HYDROCHLORIDE 0.5 MILLIGRAM(S): 2 INJECTION INTRAMUSCULAR; INTRAVENOUS; SUBCUTANEOUS at 21:31

## 2020-02-11 RX ADMIN — HYDROMORPHONE HYDROCHLORIDE 0.5 MILLIGRAM(S): 2 INJECTION INTRAMUSCULAR; INTRAVENOUS; SUBCUTANEOUS at 21:45

## 2020-02-11 RX ADMIN — SODIUM CHLORIDE 1000 MILLILITER(S): 9 INJECTION, SOLUTION INTRAVENOUS at 21:32

## 2020-02-11 NOTE — ED PROVIDER NOTE - PATIENT PORTAL LINK FT
You can access the FollowMyHealth Patient Portal offered by [facility] by registering at the following website: [email or web address]. By joining [facility]’s FollowMyHealth portal, you will also be able to view your health information using other applications (apps) compatible with our system.

## 2020-02-11 NOTE — PHYSICAL EXAM
[General Appearance - Well Developed] : well developed [General Appearance - Well Nourished] : well nourished [Normal Appearance] : normal appearance [Well Groomed] : well groomed [General Appearance - In No Acute Distress] : no acute distress [Abdomen Soft] : soft [Costovertebral Angle Tenderness] : no ~M costovertebral angle tenderness [Abdomen Tenderness] : non-tender [Urethral Meatus] : meatus normal [FreeTextEntry1] : right inguinal hernia scar, no lymphadenopathy [Scrotum] : the scrotum was normal [Urinary Bladder Findings] : the bladder was normal on palpation [Testes Mass (___cm)] : there were no testicular masses [] : no respiratory distress [Edema] : no peripheral edema [Exaggerated Use Of Accessory Muscles For Inspiration] : no accessory muscle use [Respiration, Rhythm And Depth] : normal respiratory rhythm and effort [Oriented To Time, Place, And Person] : oriented to person, place, and time [Not Anxious] : not anxious [Affect] : the affect was normal [Mood] : the mood was normal [Normal Station and Gait] : the gait and station were normal for the patient's age [No Focal Deficits] : no focal deficits [No Palpable Adenopathy] : no palpable adenopathy

## 2020-02-11 NOTE — ED ADULT NURSE NOTE - OBJECTIVE STATEMENT
patient complaints of right flank pain and n/v. Patient denies chest pain and no SOB noted. Patient denies difficulty with urination.

## 2020-02-11 NOTE — ED PROVIDER NOTE - NSFOLLOWUPINSTRUCTIONS_ED_ALL_ED_FT
Kidney Stones    Kidney stones (urolithiasis) are crystal deposits that form inside your kidneys. Pain is caused by the stone moving through the urinary tract, causing spasms of the ureter. Drink enough water and fluids to keep your urine clear or pale yellow. This will help you to pass the stone or stone fragments. If provided a strainer, strain all urine and keep all particulate matter and stones for a follow up appointment with a urologist.    SEEK IMMEDIATE MEDICAL CARE IF YOU HAVE ANY OF THE FOLLOWING SYMPTOMS: pain not controlled with medication, fever/chills, worsening vomiting, inability to urinate, or dizziness/lightheadedness.    Follow up with your primary medical doctor in 1-2 days

## 2020-02-11 NOTE — ED PROVIDER NOTE - PHYSICAL EXAMINATION
CONST: Well appearing in NAD  EYES: Sclera and conjunctiva clear.  CARD: Normal S1 S2; Normal rate and rhythm  RESP: Equal BS B/L, No wheezes, rhonchi or rales. No distress  GI: Soft, + R cva tenderness, no abd tenderness, non-distended.  MS: Normal ROM in all extremities. No edema of lower extremities, no calf pain, radial pulses 2+ bilaterally  SKIN: Warm, dry, no acute rashes. Good turgor  NEURO: A&Ox3, No focal deficits. Strength 5/5 with no sensory deficits. Steady gait

## 2020-02-11 NOTE — ED PROVIDER NOTE - CLINICAL SUMMARY MEDICAL DECISION MAKING FREE TEXT BOX
pw Flank and groin pain worsening suddenly hours after stent removal after lasering of kidney stones. Eval for complications of procedures/retained stones/obstruction shows retained gravel, no signs suggesting infection. Urology evaluated in the ED. reinforced flomax use. Rec NSAID therapy. Patient to be discharged from ED. Any available test results were discussed with patient and/or family. Verbal instructions given, including instructions to return to ED immediately for any new, worsening, or concerning symptoms. Patient endorsed understanding. Written discharge instructions additionally given, including follow-up plan.

## 2020-02-11 NOTE — CONSULT NOTE ADULT - SUBJECTIVE AND OBJECTIVE BOX
Patient is a 59y old  Male who presents with a chief complaint of     HPI:  59 y.o M with PMH of HTN, HLD, DM type 2, Brain Aneurysm on Plavix and ASA 81 mg, Kidney stones s/p multiple  procedures in the past with cystoscopy, laser lithotripsy and right JJ stent placement 2/7/20 with subsequent JJ stent removal today aground 12 noon p/w left flank pain  that started since 3 PM today associated with + Nausea, dizziness and SOB. Pt. denies fever, chills, dysuria, hematuria (was on Pyridium urine looks orange) Pt. was also prescribed course of Augmentin ? dose TID  (last dose was taken today at 2 PM, 2 more pills left).     PAST MEDICAL & SURGICAL HISTORY:  History of medical problems: former smoker quit 11/2019, yazan  Brain aneurysm  Kidney stones  High cholesterol  HTN (hypertension)  Diabetes  History of surgery: brain aneurysm repair with stent &amp; coil  Kidney stone: lithotripsy &amp; eswal  History of hernia repair: umbilical &amp; b/l inguinal      REVIEW OF SYSTEMS:    CONSTITUTIONAL:  no fevers or chills  HEENT: No visual changes  ENDO: No sweating  NECK: No pain or stiffness  MUSCULOSKELETAL: No back pain, no joint pain  RESPIRATORY: No shortness of breath  CARDIOVASCULAR: No chest pain  GASTROINTESTINAL: + Left flank pain. No abdominal or epigastric pain. No nausea, vomiting,  No diarrhea or constipation.   NEUROLOGICAL: No mental status changes  PSYCH: No depression, no mood changes  SKIN: No itching   as per HPI    Home Medications:  amLODIPine 5 mg oral tablet: 1 tab(s) orally once a day (07 Feb 2020 11:21)  ampicillin: orally every 8 hours (07 Feb 2020 11:21)  Aspir 81 oral delayed release tablet: 1 tab(s) orally once a day (07 Feb 2020 11:21)  atorvastatin 40 mg oral tablet: 1 tab(s) orally once a day (07 Feb 2020 11:21)  metFORMIN 500 mg oral tablet: orally 3 times a day (07 Feb 2020 11:21)  metoprolol succinate 50 mg oral tablet, extended release: 1 tab(s) orally once a day (07 Feb 2020 11:21)  Multiple Vitamins oral tablet: 1 tab(s) orally once a day (07 Feb 2020 11:21)  Plavix 75 mg oral tablet: 1 tab(s) orally once a day (07 Feb 2020 11:21)    MEDICATIONS  (STANDING):  HYDROmorphone  Injectable 0.5 milliGRAM(s) IV Push Once  lactated ringers Bolus 1000 milliLiter(s) IV Bolus once  tamsulosin 0.4 milliGRAM(s) Oral once         Allergies: NKDA      SOCIAL HISTORY: No illicit drug use      FAMILY HISTORY:  no pertinent family hx     Vital Signs Last 24 Hrs  T(C): 36.7 (11 Feb 2020 19:01), Max: 36.7 (11 Feb 2020 19:01)  T(F): 98 (11 Feb 2020 19:01), Max: 98 (11 Feb 2020 19:01)  HR: 87 (11 Feb 2020 19:01) (87 - 87)  BP: 207/99 (11 Feb 2020 19:01) (207/99 - 207/99)  RR: 20 (11 Feb 2020 19:01) (20 - 20)  SpO2: 96% (11 Feb 2020 19:01) (96% - 96%)     PHYSICAL EXAM:  Constitutional: NAD, well-developed, well nourished   HEENT: NC/AT,  EOMI  Neck: no pain  Back: Right CVA tenderness  Respiratory: No accessory respiratory muscle use  Abd: Obese, Soft, ND, + ttp over right flank and RLQ  Extremities: no edema  Neurological: A/O x 3  Psychiatric: Normal mood, normal affect  Skin: No rashes    LABS:                        13.3   9.43  )-----------( 192      ( 11 Feb 2020 19:59 )             39.2     02-11    136  |  95<L>  |  19  ----------------------------<  222<H>  4.4   |  25  |  1.1    Ca    10.2<H>      11 Feb 2020 19:59    TPro  8.1<H>  /  Alb  4.9  /  TBili  0.3  /  DBili  x   /  AST  30  /  ALT  30  /  AlkPhos  72  02-11 Patient is a 59y old  Male who presents with a chief complaint of Severe right-sided pain. after removal of a JJ    HPI:  59 y.o M with PMH of HTN, HLD, DM type 2, Brain Aneurysm on Plavix and ASA 81 mg, Kidney stones s/p multiple  procedures in the past with cystoscopy, laser lithotripsy and right JJ stent placement 2/7/20 with subsequent JJ stent removal today aground 12 noon p/w left flank pain  that started since 3 PM today associated with + Nausea, dizziness and SOB. Pt. denies fever, chills, dysuria, hematuria (was on Pyridium urine looks orange) Pt. was also prescribed course of Augmentin ? dose TID  (last dose was taken today at 2 PM, 2 more pills left).     PAST MEDICAL & SURGICAL HISTORY:  History of medical problems: former smoker quit 11/2019, yazan  Brain aneurysm  Kidney stones  High cholesterol  HTN (hypertension)  Diabetes  History of surgery: brain aneurysm repair with stent &amp; coil  Kidney stone: lithotripsy &amp; eswal  History of hernia repair: umbilical &amp; b/l inguinal      REVIEW OF SYSTEMS:    CONSTITUTIONAL:  no fevers or chills  HEENT: No visual changes  ENDO: No sweating  NECK: No pain or stiffness  MUSCULOSKELETAL: No back pain, no joint pain  RESPIRATORY: No shortness of breath  CARDIOVASCULAR: No chest pain  GASTROINTESTINAL: + Left flank pain. No abdominal or epigastric pain. No nausea, vomiting,  No diarrhea or constipation.   NEUROLOGICAL: No mental status changes  PSYCH: No depression, no mood changes  SKIN: No itching   as per HPI    Home Medications:  amLODIPine 5 mg oral tablet: 1 tab(s) orally once a day (07 Feb 2020 11:21)  ampicillin: orally every 8 hours (07 Feb 2020 11:21)  Aspir 81 oral delayed release tablet: 1 tab(s) orally once a day (07 Feb 2020 11:21)  atorvastatin 40 mg oral tablet: 1 tab(s) orally once a day (07 Feb 2020 11:21)  metFORMIN 500 mg oral tablet: orally 3 times a day (07 Feb 2020 11:21)  metoprolol succinate 50 mg oral tablet, extended release: 1 tab(s) orally once a day (07 Feb 2020 11:21)  Multiple Vitamins oral tablet: 1 tab(s) orally once a day (07 Feb 2020 11:21)  Plavix 75 mg oral tablet: 1 tab(s) orally once a day (07 Feb 2020 11:21)    MEDICATIONS  (STANDING):  HYDROmorphone  Injectable 0.5 milliGRAM(s) IV Push Once  lactated ringers Bolus 1000 milliLiter(s) IV Bolus once  tamsulosin 0.4 milliGRAM(s) Oral once         Allergies: NKDA      SOCIAL HISTORY: No illicit drug use      FAMILY HISTORY:  no pertinent family hx     Vital Signs Last 24 Hrs  T(C): 36.7 (11 Feb 2020 19:01), Max: 36.7 (11 Feb 2020 19:01)  T(F): 98 (11 Feb 2020 19:01), Max: 98 (11 Feb 2020 19:01)  HR: 87 (11 Feb 2020 19:01) (87 - 87)  BP: 207/99 (11 Feb 2020 19:01) (207/99 - 207/99)  RR: 20 (11 Feb 2020 19:01) (20 - 20)  SpO2: 96% (11 Feb 2020 19:01) (96% - 96%)     PHYSICAL EXAM:  Constitutional: NAD, well-developed, well nourished   HEENT: NC/AT,  EOMI  Neck: no pain  Back: Right CVA tenderness  Respiratory: No accessory respiratory muscle use  Abd: Obese, Soft, ND, + ttp over right flank and RLQ  Extremities: no edema  Neurological: A/O x 3  Psychiatric: Normal mood, normal affect  Skin: No rashes    LABS:                        13.3   9.43  )-----------( 192      ( 11 Feb 2020 19:59 )             39.2     02-11    136  |  95<L>  |  19  ----------------------------<  222<H>  4.4   |  25  |  1.1    Ca    10.2<H>      11 Feb 2020 19:59    TPro  8.1<H>  /  Alb  4.9  /  TBili  0.3  /  DBili  x   /  AST  30  /  ALT  30  /  AlkPhos  72  02-11

## 2020-02-11 NOTE — CONSULT NOTE ADULT - SUBJECTIVE AND OBJECTIVE BOX
Patient is a 59y old  Male who presents with a chief complaint of     HPI:      PAST MEDICAL & SURGICAL HISTORY:  History of medical problems: former smoker quit 11/2019, yazan  Brain aneurysm  Kidney stones  High cholesterol  HTN (hypertension)  Diabetes  History of surgery: brain aneurysm repair with stent &amp; coil  Kidney stone: lithotripsy &amp; eswal  History of hernia repair: umbilical &amp; b/l inguinal      REVIEW OF SYSTEMS:    CONSTITUTIONAL:  fevers or chills  HEENT: No visual changes  ENDO: No sweating  NECK: No pain or stiffness  MUSCULOSKELETAL: No back pain, no joint pain  RESPIRATORY: No shortness of breath  CARDIOVASCULAR: No chest pain  GASTROINTESTINAL: No abdominal or epigastric pain. No nausea, vomiting,  No diarrhea or constipation.   NEUROLOGICAL: No mental status changes  PSYCH: No depression, no mood changes  SKIN: No itching      MEDICATIONS  (STANDING):    MEDICATIONS  (PRN):      Allergies    No Known Allergies    Intolerances        SOCIAL HISTORY: No illicit drug use    FAMILY HISTORY:      Vital Signs Last 24 Hrs  T(C): 36.7 (11 Feb 2020 19:01), Max: 36.7 (11 Feb 2020 19:01)  T(F): 98 (11 Feb 2020 19:01), Max: 98 (11 Feb 2020 19:01)  HR: 87 (11 Feb 2020 19:01) (87 - 87)  BP: 207/99 (11 Feb 2020 19:01) (207/99 - 207/99)  BP(mean): --  RR: 20 (11 Feb 2020 19:01) (20 - 20)  SpO2: 96% (11 Feb 2020 19:01) (96% - 96%)    PHYSICAL EXAM:    Constitutional: NAD, well-developed  HEENT: EOMI  Neck: no pain  Back: No CVA tenderness  Respiratory: No accessory respiratory muscle use  Abd: Soft, NT/ND  no organomegally  no hernia  :   PABLO:   Extremities: no edema  Neurological: A/O x 3  Psychiatric: Normal mood, normal affect  Skin: No rashes    I&O's Summary      LABS:                        13.3   9.43  )-----------( 192      ( 11 Feb 2020 19:59 )             39.2     02-11    136  |  95<L>  |  19  ----------------------------<  222<H>  4.4   |  25  |  1.1    Ca    10.2<H>      11 Feb 2020 19:59    TPro  8.1<H>  /  Alb  4.9  /  TBili  0.3  /  DBili  x   /  AST  30  /  ALT  30  /  AlkPhos  72  02-11        Urine Culture:         RADIOLOGY & ADDITIONAL STUDIES:

## 2020-02-11 NOTE — HISTORY OF PRESENT ILLNESS
[FreeTextEntry1] : TRACY TOSCANO is a 59 year old male hx b/l IHR ~2010 who presents with multiple right ureteral and b/l non obstructing renal stones sp cystoscopy, right ureteral stent insertion 1/12/19, 2 cm right anterior bladder/groin soft tissue nodule sp right ureteroscopy, laser lithotripsy, ureteral stent exchange of two right ureteral stones and two right renal stonee 6x26 JJ ureteral stent on a string 2/7/20\par \par Doing well. Denies flank pain. Uncomfortable w stent on string however. no f/c. took abx\par \par Prev Admitted to the hospital with severe intermittent right flank pain and was found to have the ureteral stone. pain is now improved with ureteral stent insertion. Denies gross hematuria, dysuria or associated symptoms. Denies pelvic pain.\par CT abdomen and pelvis 1/2019 images visualized and there is a 1.1 cm proximal right ureteral stone along with a second 7 mm distal right ureteral stone. Visible on . +hydro.\par There are bilateral nonobstructing stones in the kidneys (max 4 mm right, 5 mm left)\par There is also a nonspecific 2.3 cm soft tissue density anterior to the urinary bladder and right groin\par \par Denies  PMH including previous kidney stones, recurrent UTIs. \par Family History: No  malignancies\par Soc hx window repair\par \par Cr 0.8 1/2020\par Old records reviewed including imaging 2010 CT a/p and there was no soft tissue right groin nodule however this was likely pre IHR. Small left non obstructing renal stone

## 2020-02-11 NOTE — ED PROVIDER NOTE - PROGRESS NOTE DETAILS
Discussed case with urology. seen by attending at bedside.  clear for d/c.  motrin for pain. Discussed results with pt.  All questions were answered and return precautions discussed.  Pt is asx and comfortable at this time.  Unremarkable re-exam.  No further concerns at this time from pt.  Will follow up with PMD and .  Pt understands and agrees with tx plan.

## 2020-02-11 NOTE — CONSULT NOTE ADULT - ASSESSMENT
59 y.o M with h/p nephrolithiasis s/p cystoscopy right ureteroscopy, laser lithotripsy stent placement 2/7/20 s/p right ureteral stent removal today p/w right flank pain since 3 PM today.       Plan   UA   IVF  Flomax   Awaiting for CT A/P will f/u   Cont Abx   will F/U   Case d/w Dr. Cai 59 y.o M with h/p nephrolithiasis s/p cystoscopy right ureteroscopy, laser lithotripsy stent placement 2/7/20 s/p right ureteral stent removal today p/w right flank pain since 3 PM today.       Plan   UA   IVF  Flomax   Awaiting for CT A/P will f/u   Cont Abx   will F/U   Case d/w Dr. Cai      addendum: Imaging study reviewed    < from: CT Abdomen and Pelvis w/ IV Cont (02.11.20 @ 22:46) >  EXAM:  CT ABDOMEN AND PELVIS IC            PROCEDURE DATE:  02/11/2020            INTERPRETATION:  CLINICAL STATEMENT: Right flank pain.      TECHNIQUE: Contiguous axial CT images were obtained from the lower chest to the pubic symphysis following administration of 100cc Optiray 320 intravenous contrast.  Oral contrast was not administered.  Reformatted images in the coronal and sagittal planes were acquired.    COMPARISON CT: CT abdomen and pelvis performed 1/11/2020.    FINDINGS:    LOWER CHEST: Bibasilar subsegmental atelectasis.    HEPATOBILIARY: Hepatic steatosis..    SPLEEN: Unremarkable.    PANCREAS: Unremarkable.    ADRENAL GLANDS: Calcification of the left adrenal gland. Unremarkable right adrenal gland.    KIDNEYS: There is a delayed left nephrogram with moderate hydroureteronephrosis secondary to a 1.1 x 0.7 x 0.5 cm calculus within the right distal ureter (approximate Hounsfield units 700), likely reflecting interval migration of previously noted proximal ureteral calculus. Additional smaller calculi are noted distally between the larger stone and bladder within the distal ureter. Associated mildly increased perirenal and periureteral inflammatory changes. Bilateral nonobstructing renal calculi measuring up to 0.3 cm in the right upper pole and 0.6 cm and the left interpolar region. The previously noted right lower pole calculus is no longer visualized and may have migrated to the distal ureter.    ABDOMINOPELVIC NODES: Unremarkable.    PELVIC ORGANS: Unremarkable.    PERITONEUM/MESENTERY/BOWEL: No bowel obstruction. No pneumoperitoneum or ascites. Bilateral inguinal hernia repair.     BONES/SOFT TISSUES: Degenerative changes of the thoracolumbar spine.    OTHER: Atherosclerotic disease of aorta.      IMPRESSION:  Since January 11, 2020:    Moderate hydroureteronephrosis secondary to a 1.1 x 0.7 x 0.5 cm calculus within the right distal ureter, and likely reflecting interval migration of previously noted proximal ureteral calculus.Additional smaller calculi noted within the distal ureter between the larger proximal stone urinary bladder.    Bilateral nonobstructing renal calculi measuring up to 0.3 cm in the right upper pole and 0.6 cm and the left interpolar region. Previously noted right lower pole calculus is no longer visualized and may have migrated to the distal ureter.    JUANITO LOPEZ M.D., RESIDENT RADIOLOGIST  This document has been electronically signed.  PRAFUL MCBRIDE M.D., ATTENDING RADIOLOGIST  This document has been electronically signed. Feb 11 2020 11:14PM    < end of copied text >      Plan  Admit to medicine for pain control  IVF  Cont. Flomax   Analgesia prn x pain (Toradol, Percocet)  Monitor VS, please contact urology if fever or chills  NPO after midnight    attending will f/u Case d/w Dr. Cai 59 y.o M with h/p nephrolithiasis s/p cystoscopy right ureteroscopy, laser lithotripsy stent placement 2/7/20 s/p right ureteral stent removal today p/w right flank pain since 3 PM today.       Plan   UA   IVF  Flomax   Awaiting for CT A/P will f/u   Cont Abx   will F/U   Case d/w Dr. Cai      addendum: Imaging study reviewed    < from: CT Abdomen and Pelvis w/ IV Cont (02.11.20 @ 22:46) >  EXAM:  CT ABDOMEN AND PELVIS IC            PROCEDURE DATE:  02/11/2020            INTERPRETATION:  CLINICAL STATEMENT: Right flank pain.      TECHNIQUE: Contiguous axial CT images were obtained from the lower chest to the pubic symphysis following administration of 100cc Optiray 320 intravenous contrast.  Oral contrast was not administered.  Reformatted images in the coronal and sagittal planes were acquired.    COMPARISON CT: CT abdomen and pelvis performed 1/11/2020.    FINDINGS:    LOWER CHEST: Bibasilar subsegmental atelectasis.    HEPATOBILIARY: Hepatic steatosis..    SPLEEN: Unremarkable.    PANCREAS: Unremarkable.    ADRENAL GLANDS: Calcification of the left adrenal gland. Unremarkable right adrenal gland.    KIDNEYS: There is a delayed left nephrogram with moderate hydroureteronephrosis secondary to a 1.1 x 0.7 x 0.5 cm calculus within the right distal ureter (approximate Hounsfield units 700), likely reflecting interval migration of previously noted proximal ureteral calculus. Additional smaller calculi are noted distally between the larger stone and bladder within the distal ureter. Associated mildly increased perirenal and periureteral inflammatory changes. Bilateral nonobstructing renal calculi measuring up to 0.3 cm in the right upper pole and 0.6 cm and the left interpolar region. The previously noted right lower pole calculus is no longer visualized and may have migrated to the distal ureter.    ABDOMINOPELVIC NODES: Unremarkable.    PELVIC ORGANS: Unremarkable.    PERITONEUM/MESENTERY/BOWEL: No bowel obstruction. No pneumoperitoneum or ascites. Bilateral inguinal hernia repair.     BONES/SOFT TISSUES: Degenerative changes of the thoracolumbar spine.    OTHER: Atherosclerotic disease of aorta.      IMPRESSION:  Since January 11, 2020:    Moderate hydroureteronephrosis secondary to a 1.1 x 0.7 x 0.5 cm calculus within the right distal ureter, and likely reflecting interval migration of previously noted proximal ureteral calculus.Additional smaller calculi noted within the distal ureter between the larger proximal stone urinary bladder.    Bilateral nonobstructing renal calculi measuring up to 0.3 cm in the right upper pole and 0.6 cm and the left interpolar region. Previously noted right lower pole calculus is no longer visualized and may have migrated to the distal ureter.    JUANITO LOPEZ M.D., RESIDENT RADIOLOGIST  This document has been electronically signed.  PRAFUL MCBRIDE M.D., ATTENDING RADIOLOGIST  This document has been electronically signed. Feb 11 2020 11:14PM    < end of copied text >      Plan  Pt seen and examined by Dr Beasley in my presence. Dr Beasley reveiwed CT scan which shows a Steinstrasse (gravel). All stone fragments are quite able to pass into the bladder. This was discussed in detail with pt and the ED attending. Therefore pt may be discharged home with Flomax and Toradol for pain. Pt should strain all urine. If pt develops fever or if pain is intolerable, pt is instructed to return to the ED. Otherwise pt will f/u with Dr Cai this week. 59 y.o M with h/p nephrolithiasis s/p cystoscopy right ureteroscopy, laser lithotripsy stent placement 2/7/20 s/p right ureteral stent removal today p/w right flank pain since 3 PM today.       Plan   UA   IVF  Flomax   Awaiting for CT A/P will f/u   Cont Abx   will F/U   Case d/w Dr. Cai      addendum: Imaging study reviewed    < from: CT Abdomen and Pelvis w/ IV Cont (02.11.20 @ 22:46) >  EXAM:  CT ABDOMEN AND PELVIS IC            PROCEDURE DATE:  02/11/2020            INTERPRETATION:  CLINICAL STATEMENT: Right flank pain.      TECHNIQUE: Contiguous axial CT images were obtained from the lower chest to the pubic symphysis following administration of 100cc Optiray 320 intravenous contrast.  Oral contrast was not administered.  Reformatted images in the coronal and sagittal planes were acquired.    COMPARISON CT: CT abdomen and pelvis performed 1/11/2020.    FINDINGS:    LOWER CHEST: Bibasilar subsegmental atelectasis.    HEPATOBILIARY: Hepatic steatosis..    SPLEEN: Unremarkable.    PANCREAS: Unremarkable.    ADRENAL GLANDS: Calcification of the left adrenal gland. Unremarkable right adrenal gland.    KIDNEYS: There is a delayed left nephrogram with moderate hydroureteronephrosis secondary to a 1.1 x 0.7 x 0.5 cm calculus within the right distal ureter (approximate Hounsfield units 700), likely reflecting interval migration of previously noted proximal ureteral calculus. Additional smaller calculi are noted distally between the larger stone and bladder within the distal ureter. Associated mildly increased perirenal and periureteral inflammatory changes. Bilateral nonobstructing renal calculi measuring up to 0.3 cm in the right upper pole and 0.6 cm and the left interpolar region. The previously noted right lower pole calculus is no longer visualized and may have migrated to the distal ureter.    ABDOMINOPELVIC NODES: Unremarkable.    PELVIC ORGANS: Unremarkable.    PERITONEUM/MESENTERY/BOWEL: No bowel obstruction. No pneumoperitoneum or ascites. Bilateral inguinal hernia repair.     BONES/SOFT TISSUES: Degenerative changes of the thoracolumbar spine.    OTHER: Atherosclerotic disease of aorta.      IMPRESSION:  Since January 11, 2020:    Moderate hydroureteronephrosis secondary to a 1.1 x 0.7 x 0.5 cm calculus within the right distal ureter, and likely reflecting interval migration of previously noted proximal ureteral calculus.Additional smaller calculi noted within the distal ureter between the larger proximal stone urinary bladder.    Bilateral nonobstructing renal calculi measuring up to 0.3 cm in the right upper pole and 0.6 cm and the left interpolar region. Previously noted right lower pole calculus is no longer visualized and may have migrated to the distal ureter.    JUANITO LOPEZ M.D., RESIDENT RADIOLOGIST  This document has been electronically signed.  PRAFUL MCBRIDE M.D., ATTENDING RADIOLOGIST  This document has been electronically signed. Feb 11 2020 11:14PM    < end of copied text >      Plan  Pt seen and examined by Dr Beasley in my presence. Dr Beasley reveiwed CT scan which shows a Steinstrasse (gravel), Not an 11 mm distal stone. I reviewed the film on bone windows with the patient as well as ER attendingan recommended discharge with nonsteroidal anti-inflammatories especially as right now the patient appears to be a lot more comfortable. All stone fragments are quite able to pass into the bladder. This was discussed in detail with pt and the ED attending. Therefore pt may be discharged home with Flomax and Toradol for pain. Pt should strain all urine. If pt develops fever or if pain is intolerable, pt is instructed to return to the ED. Otherwise pt will f/u with Dr Cai this week.

## 2020-02-11 NOTE — ASSESSMENT
[FreeTextEntry1] : TRACY TOSCANO is a 59 year old male on dual antiplatelets, hx b/l IHR ~2010 who presents with multiple right ureteral and b/l non obstructing renal stones sp cystoscopy, right ureteral stent insertion 1/12/19, 2 cm right anterior bladder/groin soft tissue nodule sp right ureteroscopy, laser lithotripsy, ureteral stent exchange of two right ureteral stones and two right renal stonee 6x26 JJ ureteral stent on a string 2/7/20\par \par Likely Future LEFT ESWL after pt is off dual antiplatelets April 2020\par KUB\par Right soft tissue nodule likely related to prior hernia surgery however will obtain f/u CT May 2020 confirm stability\par 24 hour metabolic w/u\par \par

## 2020-02-11 NOTE — ED ADULT NURSE NOTE - CHPI ED NUR SYMPTOMS NEG
no diarrhea/no hematuria/no blood in stool/no chills/no burning urination/no fever/no dysuria/no abdominal distension

## 2020-02-11 NOTE — ED PROVIDER NOTE - NS ED ROS FT
Constitutional: See HPI.  Eyes: No visual changes, eye pain or discharge.   ENMT: No hearing changes, pain, discharge or infections.   Cardiac: No SOB or edema. No chest pain with exertion.  Respiratory: No cough or respiratory distress.   GI: + flank pain, + nausea. No vomiting, diarrhea or abdominal pain.  : No dysuria, frequency or burning. No Discharge  MS: No myalgia, muscle weakness, joint pain or back pain.  Neuro: No headache or weakness.   Skin: No skin rash.  Except as documented in the HPI, all other systems are negative.

## 2020-02-11 NOTE — ED PROVIDER NOTE - ATTENDING CONTRIBUTION TO CARE
59 Y M CAD s/p stenting on plavix, former smoker quit 11/19, nephrolithiasis s/p stenting, lasering of stones, and stent removal (right) at noon, pw onset of right flank pain, nausea, and sob worsening since sudden onset 3pm. No fever, vomiting, diarrhea, hematuria.  Exam: NAD, NCAT, HEENT: mmm, EOMI, PERRLA, Neck: supple, nontender, nl ROM, Heart: RRR, no murmur, Lungs: mildly decreased breath sounds with scant diffuse wheezing b/l on expiration. no tachypnea, pursed lip breathing, or tripodding. Abd: NTND, no guarding or rebound, no hernia palpated, + right CVAT. MSK: chest, back, and ext nontender, nl rom, no deformity. Neuro: A&Ox3, normal strength, nl sensation throughout, normal speech.  A/P: Eval for complications of procedures/retained stones/obstruction. Treat wheezing given hx of smoking with nebs. Labs, imaging, fluids, symptom control, reassess. FINA saw pt in ED and agrees with plan.

## 2020-02-11 NOTE — CONSULT NOTE ADULT - ATTENDING COMMENTS
I personally saw and examined the patient, reviewed the chart and available data. I discussed the situation with the patient and The brother-in-law who was accompanying him as well as the ER attending in [place] staff. I also reviewed and/or amended the note as necessary.

## 2020-02-11 NOTE — ED PROVIDER NOTE - CARE PROVIDER_API CALL
Jann Cai)  [facility]  [address] [place] [address], [address]  [place] [address]  Phone: [phone]  Fax: [phone]  Follow Up Time: 1-3 Days

## 2020-02-11 NOTE — ED PROVIDER NOTE - OBJECTIVE STATEMENT
59 y.o male w/ hx HTN, HLD, CAD, renal calc presents to the ED for evaluation of R flank pain since 1500 hrs.  Earlier today had ureteral stent removed by Dr. Alfred.  Developed acute onset R flank pain, constant, radiates to anterior abdomen, sharp, stabbing, moderate severity, no alleviating factors,  admits o nausea and chills.  Denies chest pain, fever, diarrhea, constipation, urinary sxs.

## 2020-02-12 DIAGNOSIS — E78.00 PURE HYPERCHOLESTEROLEMIA, UNSPECIFIED: ICD-10-CM

## 2020-02-12 DIAGNOSIS — Z79.82 LONG TERM (CURRENT) USE OF ASPIRIN: ICD-10-CM

## 2020-02-12 DIAGNOSIS — E11.9 TYPE 2 DIABETES MELLITUS WITHOUT COMPLICATIONS: ICD-10-CM

## 2020-02-12 DIAGNOSIS — Z79.02 LONG TERM (CURRENT) USE OF ANTITHROMBOTICS/ANTIPLATELETS: ICD-10-CM

## 2020-02-12 DIAGNOSIS — N20.2 CALCULUS OF KIDNEY WITH CALCULUS OF URETER: ICD-10-CM

## 2020-02-12 DIAGNOSIS — I10 ESSENTIAL (PRIMARY) HYPERTENSION: ICD-10-CM

## 2020-02-12 RX ORDER — IBUPROFEN 200 MG
1 TABLET ORAL
Qty: 20 | Refills: 0
Start: 2020-02-12 | End: 2020-02-16

## 2020-02-14 ENCOUNTER — APPOINTMENT (OUTPATIENT)
Dept: UROLOGY | Facility: HOSPITAL | Age: 60
End: 2020-02-14
Payer: COMMERCIAL

## 2020-02-14 ENCOUNTER — OUTPATIENT (OUTPATIENT)
Dept: OUTPATIENT SERVICES | Facility: HOSPITAL | Age: 60
LOS: 1 days | Discharge: HOME | End: 2020-02-14

## 2020-02-14 VITALS
HEIGHT: 67 IN | WEIGHT: 197.98 LBS | OXYGEN SATURATION: 97 % | DIASTOLIC BLOOD PRESSURE: 80 MMHG | RESPIRATION RATE: 17 BRPM | TEMPERATURE: 97 F | HEART RATE: 65 BPM | SYSTOLIC BLOOD PRESSURE: 134 MMHG

## 2020-02-14 VITALS — HEART RATE: 71 BPM | SYSTOLIC BLOOD PRESSURE: 145 MMHG | DIASTOLIC BLOOD PRESSURE: 84 MMHG

## 2020-02-14 DIAGNOSIS — N20.0 CALCULUS OF KIDNEY: Chronic | ICD-10-CM

## 2020-02-14 DIAGNOSIS — R10.9 UNSPECIFIED ABDOMINAL PAIN: ICD-10-CM

## 2020-02-14 DIAGNOSIS — Z98.890 OTHER SPECIFIED POSTPROCEDURAL STATES: Chronic | ICD-10-CM

## 2020-02-14 LAB — GLUCOSE BLDC GLUCOMTR-MCNC: 173 MG/DL — HIGH (ref 70–99)

## 2020-02-14 PROCEDURE — 52356 CYSTO/URETERO W/LITHOTRIPSY: CPT | Mod: RT

## 2020-02-14 RX ORDER — SODIUM CHLORIDE 9 MG/ML
1000 INJECTION, SOLUTION INTRAVENOUS
Refills: 0 | Status: DISCONTINUED | OUTPATIENT
Start: 2020-02-14 | End: 2020-03-03

## 2020-02-14 RX ORDER — ONDANSETRON 8 MG/1
4 TABLET, FILM COATED ORAL ONCE
Refills: 0 | Status: DISCONTINUED | OUTPATIENT
Start: 2020-02-14 | End: 2020-03-03

## 2020-02-14 RX ORDER — HYDROMORPHONE HYDROCHLORIDE 2 MG/ML
0.5 INJECTION INTRAMUSCULAR; INTRAVENOUS; SUBCUTANEOUS
Refills: 0 | Status: DISCONTINUED | OUTPATIENT
Start: 2020-02-14 | End: 2020-02-14

## 2020-02-14 RX ORDER — AMPICILLIN TRIHYDRATE 250 MG
0 CAPSULE ORAL
Qty: 0 | Refills: 0 | DISCHARGE

## 2020-02-14 RX ADMIN — SODIUM CHLORIDE 100 MILLILITER(S): 9 INJECTION, SOLUTION INTRAVENOUS at 17:50

## 2020-02-14 NOTE — BRIEF OPERATIVE NOTE - OPERATION/FINDINGS
sp right URS/LL/ureteral stent insertion, steinstraasse at level of pelvic brim next to ~2 cm narrow caliber ureter/ureteral stricture.

## 2020-02-14 NOTE — CHART NOTE - NSCHARTNOTEFT_GEN_A_CORE
59y Male    for Cysto, right ureteroscopy, lithotripsy    HPI:      PAST MEDICAL & SURGICAL HISTORY:  History of medical problems: former smoker quit 2019, yazan  Brain aneurysm  Kidney stones  High cholesterol  HTN (hypertension)  Diabetes  History of surgery: brain aneurysm coiling with stent &amp; coil  Kidney stone: lithotripsy &amp; eswal  History of hernia repair: umbilical &amp; b/l inguinal        MEDICATIONS  (STANDING):    MEDICATIONS  (PRN):    Home Medications:  amLODIPine 5 mg oral tablet: 1 tab(s) orally once a day (2020 13:03)  Aspir 81 oral delayed release tablet: 1 tab(s) orally once a day (2020 13:03)  atorvastatin 40 mg oral tablet: 1 tab(s) orally once a day (2020 13:03)  metFORMIN 500 mg oral tablet: orally 3 times a day (2020 13:03)  metoprolol succinate 50 mg oral tablet, extended release: 1 tab(s) orally once a day (2020 13:03)  Plavix 75 mg oral tablet: 1 tab(s) orally once a day (2020 13:03)      Allergies    No Known Allergies    Intolerances        SOCIAL HISTORY:    FAMILY HISTORY:      Vital Signs Last 24 Hrs    see anesthesia record    NPO: _x__ Yes  ____ No    Exam:  Drug Dosing Weight  Height (cm): 170.18 (2020 12:57)  Weight (kg): 89.8 (2020 12:57)  BMI (kg/m2): 31 (2020 12:57)  BSA (m2): 2.01 (2020 12:57)    MP:II  Teeth: Intact  Mouth opening:    LABS:                        13.3   9.43  )-----------( 192      ( 2020 19:59 )             39.2                         12.5   7.35  )-----------( 204      ( 2020 13:50 )             36.1                         11.5   5.76  )-----------( 147      ( 2020 06:51 )             33.0         02-11    136  |  95<L>  |  19  ----------------------------<  222<H>  4.4   |  25  |  1.1      140  |  100  |  15  ----------------------------<  230<H>  4.5   |  25  |  0.7      138  |  101  |  13  ----------------------------<  148<H>  4.0   |  22  |  0.8      139  |  101  |  15  ----------------------------<  195<H>  4.2   |  20  |  0.8    Ca    10.2<H>      2020 19:59  Ca    10.4<H>      2020 13:50  Ca    9.2      2020 06:51    TPro  8.1<H>  /  Alb  4.9  /  TBili  0.3  /  DBili  x   /  AST  30  /  ALT  30  /  AlkPhos  72    TPro  7.7  /  Alb  4.6  /  TBili  0.3  /  DBili  x   /  AST  30  /  ALT  43<H>  /  AlkPhos  71    TPro  7.7  /  Alb  4.6  /  TBili  0.3  /  DBili  <0.2  /  AST  22  /  ALT  34  /  AlkPhos  82        Urinalysis Basic - ( 2020 13:50 )    Color: Red / Appearance: Clear / S.025 / pH: x  Gluc: x / Ketone: Negative  / Bili: Small / Urobili: 2.0   Blood: x / Protein: 100 / Nitrite: Positive   Leuk Esterase: Negative / RBC: >50 /HPF / WBC 3-5 /HPF   Sq Epi: x / Non Sq Epi: x / Bacteria: Few /HPF    Urinalysis Basic - ( 2020 15:40 )    Color: Light Yellow / Appearance: Clear / S.021 / pH: x  Gluc: x / Ketone: Trace  / Bili: Negative / Urobili: <2 mg/dL   Blood: x / Protein: 30 mg/dL / Nitrite: Negative   Leuk Esterase: Negative / RBC: 31 /HPF / WBC 1 /HPF   Sq Epi: x / Non Sq Epi: 0 /HPF / Bacteria: Negative        RADIOLOGY & ADDITIONAL STUDIES:      ASA _III___,  R/B/A discussed with: __x__ patient, ____ guardian, Understand and Accepts,  Question Answered.

## 2020-02-14 NOTE — ASU DISCHARGE PLAN (ADULT/PEDIATRIC) - ASU DC SPECIAL INSTRUCTIONSFT
You had a ureteral stent placed in your ureter to help keep the ureter open post operatively. You can take tylenol as needed for pain control.   Please complete the antibiotic course which was also prescribed to your pharmacy. Save one antibiotic tablet for the day of stent removal. Please note that the stent is temporary and must be removed.  's office will call you for a follow up appointment.

## 2020-02-14 NOTE — BRIEF OPERATIVE NOTE - NSICDXBRIEFPROCEDURE_GEN_ALL_CORE_FT
PROCEDURES:  Ureteroscopy, with laser lithotripsy and stent insertion 14-Feb-2020 17:19:58  Jann Cai

## 2020-02-20 DIAGNOSIS — N20.1 CALCULUS OF URETER: ICD-10-CM

## 2020-02-20 DIAGNOSIS — N13.5 CROSSING VESSEL AND STRICTURE OF URETER WITHOUT HYDRONEPHROSIS: ICD-10-CM

## 2020-02-20 DIAGNOSIS — Z79.82 LONG TERM (CURRENT) USE OF ASPIRIN: ICD-10-CM

## 2020-02-20 DIAGNOSIS — E11.9 TYPE 2 DIABETES MELLITUS WITHOUT COMPLICATIONS: ICD-10-CM

## 2020-02-20 DIAGNOSIS — Z87.891 PERSONAL HISTORY OF NICOTINE DEPENDENCE: ICD-10-CM

## 2020-02-20 DIAGNOSIS — E78.00 PURE HYPERCHOLESTEROLEMIA, UNSPECIFIED: ICD-10-CM

## 2020-02-27 ENCOUNTER — APPOINTMENT (OUTPATIENT)
Dept: UROLOGY | Facility: CLINIC | Age: 60
End: 2020-02-27
Payer: COMMERCIAL

## 2020-02-27 PROCEDURE — 99214 OFFICE O/P EST MOD 30 MIN: CPT | Mod: 25

## 2020-02-27 PROCEDURE — 52310 CYSTOSCOPY AND TREATMENT: CPT

## 2020-02-27 NOTE — PHYSICAL EXAM
[General Appearance - Well Developed] : well developed [General Appearance - Well Nourished] : well nourished [Well Groomed] : well groomed [Normal Appearance] : normal appearance [Abdomen Soft] : soft [General Appearance - In No Acute Distress] : no acute distress [Abdomen Tenderness] : non-tender [Costovertebral Angle Tenderness] : no ~M costovertebral angle tenderness [Urethral Meatus] : meatus normal [Scrotum] : the scrotum was normal [Urinary Bladder Findings] : the bladder was normal on palpation [Testes Mass (___cm)] : there were no testicular masses [Edema] : no peripheral edema [Respiration, Rhythm And Depth] : normal respiratory rhythm and effort [] : no respiratory distress [Exaggerated Use Of Accessory Muscles For Inspiration] : no accessory muscle use [Oriented To Time, Place, And Person] : oriented to person, place, and time [Not Anxious] : not anxious [Mood] : the mood was normal [Affect] : the affect was normal [No Focal Deficits] : no focal deficits [Normal Station and Gait] : the gait and station were normal for the patient's age [No Palpable Adenopathy] : no palpable adenopathy [FreeTextEntry1] : right inguinal hernia scar, no lymphadenopathy

## 2020-02-27 NOTE — ASSESSMENT
[FreeTextEntry1] : TRACY TOSCANO is a 59 year old male on dual antiplatelets, hx b/l IHR ~2010 who presents with multiple right ureteral and b/l non obstructing renal stones sp cystoscopy, right ureteral stent insertion 1/12/19, 2 cm right anterior bladder/groin soft tissue nodule sp right ureteroscopy, laser lithotripsy, ureteral stent exchange of two right ureteral stones and two right renal stone 6x26 JJ ureteral stent on a string 2/7/20,\par and sp right URS/LL/ureteral stent insertion 2/14/20 for steinstraasse at level of pelvic brim at level of ~2 cm narrow caliber ureter/ureteral stricture. no typical stricture intraluminal fibrosis or ureteral lesions, therefore patients own anatomy vs edema from chronic stone. \par \par We discussed stricture tx endoscopic techniques vs reconstructive techniques vs continued ureteral stent vs trial without stent and pt preferred latter as this very well may be secondary to stone impaction and self-limiting.\par Stent removed today.\par ER precautions given.\par \par Right soft tissue nodule likely related to prior hernia surgery however will asses stability.\par CTU and will also reassess hydro/stricture 5/2020\par Likely Future LEFT ESWL after pt is off dual antiplatelets April 2020\par fu 24 hour metabolic w/u\par \par

## 2020-02-27 NOTE — HISTORY OF PRESENT ILLNESS
[FreeTextEntry1] : TRACY TOSCANO is a 59 year old male on dual antiplatelets, hx b/l IHR ~2010 who presents with multiple right ureteral and b/l non obstructing renal stones sp cystoscopy, right ureteral stent insertion 1/12/19, 2 cm right anterior bladder/groin soft tissue nodule sp right ureteroscopy, laser lithotripsy, ureteral stent exchange of two right ureteral stones and two right renal stone 6x26 JJ ureteral stent on a string 2/7/20,\par and sp right URS/LL/ureteral stent insertion 2/14/20 for steinstraasse at level of pelvic brim at level of ~2 cm narrow caliber ureter/ureteral stricture. no typical stricture intraluminal fibrosis or ureteral lesions, therefore patients own anatomy vs edema from chronic stone. \par \par Doing well. Denies flank pain. no f/c. took abx.\par Xray fluoroscopy images visualized demonstrating the narrow ureter 2 cm at pelvic brim\par \par Prev Admitted to the hospital with severe intermittent right flank pain and was found to have the ureteral stone. pain is now improved with ureteral stent insertion. Denies gross hematuria, dysuria or associated symptoms. Denies pelvic pain.\par CT abdomen and pelvis 1/2019 images visualized and there is a 1.1 cm proximal right ureteral stone along with a second 7 mm distal right ureteral stone. Visible on . +hydro.\par There are bilateral nonobstructing stones in the kidneys (max 4 mm right, 5 mm left)\par There is also a nonspecific 2.3 cm soft tissue density anterior to the urinary bladder and right groin\par \par Denies  PMH including previous kidney stones, recurrent UTIs. \par Family History: No  malignancies\par Soc hx window repair\par \par Cr 0.8 1/2020\par Old records reviewed including imaging 2010 CT a/p and there was no soft tissue right groin nodule however this was likely pre IHR. Small left non obstructing renal stone

## 2020-03-06 LAB
INR PPP: 2.3 RATIO
POCT-PROTHROMBIN TIME: 28.1 SECS

## 2020-03-11 ENCOUNTER — RX RENEWAL (OUTPATIENT)
Age: 60
End: 2020-03-11

## 2020-03-13 LAB — CREAT SERPL-MCNC: 0.9 MG/DL

## 2020-03-24 ENCOUNTER — LABORATORY RESULT (OUTPATIENT)
Age: 60
End: 2020-03-24

## 2020-04-05 ENCOUNTER — RX RENEWAL (OUTPATIENT)
Age: 60
End: 2020-04-05

## 2020-04-11 ENCOUNTER — RX RENEWAL (OUTPATIENT)
Age: 60
End: 2020-04-11

## 2020-04-16 ENCOUNTER — APPOINTMENT (OUTPATIENT)
Dept: CARDIOLOGY | Facility: CLINIC | Age: 60
End: 2020-04-16
Payer: COMMERCIAL

## 2020-04-16 DIAGNOSIS — G40.909 EPILEPSY, UNSPECIFIED, NOT INTRACTABLE, W/OUT STATUS EPILEPTICUS: ICD-10-CM

## 2020-04-16 PROCEDURE — 99441: CPT

## 2020-04-16 PROCEDURE — G2012 BRIEF CHECK IN BY MD/QHP: CPT

## 2020-04-16 PROCEDURE — 98967 PH1 ASSMT&MGMT NQHP 11-20: CPT

## 2020-04-16 NOTE — HISTORY OF PRESENT ILLNESS
[FreeTextEntry1] : pt is awre of this telephone visit due to covid 19\par  pt was hospitalised for seizures and uncontrolled diabetes\par  pt came home 3 days ago on levecetram 500 mg po bid no seizures\par  also diabetes is better fbs down to 130\par  no chest pains or dyspnoea\par  meds reviewed with pt

## 2020-04-16 NOTE — ASSESSMENT
[FreeTextEntry1] : no seizures since discharge from [facility]\par  diabetes is better\par  meds reviewed with pt

## 2020-04-16 NOTE — ASSESSMENT
[FreeTextEntry1] : no seizures since discharge from [facility]\par  diabetes is better\par  meds reviewed with pt

## 2020-04-23 ENCOUNTER — RX RENEWAL (OUTPATIENT)
Age: 60
End: 2020-04-23

## 2020-05-09 ENCOUNTER — RX RENEWAL (OUTPATIENT)
Age: 60
End: 2020-05-09

## 2020-05-19 ENCOUNTER — APPOINTMENT (OUTPATIENT)
Dept: UROLOGY | Facility: CLINIC | Age: 60
End: 2020-05-19

## 2020-05-29 LAB
INR PPP: 2 RATIO
POCT-PROTHROMBIN TIME: 24.5 SECS

## 2020-06-04 ENCOUNTER — RX RENEWAL (OUTPATIENT)
Age: 60
End: 2020-06-04

## 2020-06-11 ENCOUNTER — APPOINTMENT (OUTPATIENT)
Dept: UROLOGY | Facility: CLINIC | Age: 60
End: 2020-06-11
Payer: MEDICARE

## 2020-06-11 VITALS — BODY MASS INDEX: 31.2 KG/M2 | WEIGHT: 198.8 LBS | HEIGHT: 67 IN | TEMPERATURE: 98.1 F

## 2020-06-11 DIAGNOSIS — R19.09 OTHER INTRA-ABDOMINAL AND PELVIC SWELLING, MASS AND LUMP: ICD-10-CM

## 2020-06-11 DIAGNOSIS — N13.5 CROSSING VESSEL AND STRICTURE OF URETER W/OUT HYDRONEPHROSIS: ICD-10-CM

## 2020-06-11 DIAGNOSIS — N20.1 CALCULUS OF URETER: ICD-10-CM

## 2020-06-11 PROCEDURE — 99215 OFFICE O/P EST HI 40 MIN: CPT

## 2020-06-11 NOTE — ASSESSMENT
[FreeTextEntry1] : TRACY TOSCANO is a 59 year old male on dual antiplatelets, hx b/l IHR ~2010 who presents with multiple right ureteral and b/l non obstructing renal stones sp cystoscopy, right ureteral stent insertion 1/12/19, 2 cm right anterior bladder/groin soft tissue nodule sp right ureteroscopy, laser lithotripsy, ureteral stent exchange of two right ureteral stones and two right renal stone 6x26 JJ ureteral stent on a string 2/7/20,\par and sp right URS/LL/ureteral stent insertion 2/14/20 for steinstraasse at level of pelvic brim at level of ~2 cm narrow caliber ureter/ureteral stricture. no typical stricture intraluminal fibrosis or ureteral lesions, therefore patients own anatomy vs edema from chronic stone. \par Hypocitraturia.\par \par Right soft tissue nodule likely related to prior hernia surgery however will asses stability.\par CTU and will also reassess hydro/stricture \par K cit start ppx dose\par future consider eswl once off antiplatelet\par Pt going for coiling of aneurism again \par

## 2020-06-11 NOTE — PHYSICAL EXAM
[General Appearance - Well Developed] : well developed [General Appearance - Well Nourished] : well nourished [Normal Appearance] : normal appearance [Well Groomed] : well groomed [General Appearance - In No Acute Distress] : no acute distress [Edema] : no peripheral edema [] : no rash [Respiration, Rhythm And Depth] : normal respiratory rhythm and effort [Oriented To Time, Place, And Person] : oriented to person, place, and time [Exaggerated Use Of Accessory Muscles For Inspiration] : no accessory muscle use [Affect] : the affect was normal [Not Anxious] : not anxious [Mood] : the mood was normal [No Focal Deficits] : no focal deficits [No Palpable Adenopathy] : no palpable adenopathy [Normal Station and Gait] : the gait and station were normal for the patient's age

## 2020-06-11 NOTE — HISTORY OF PRESENT ILLNESS
[FreeTextEntry1] : TRACY TOSCANO is a 59 year old male on dual antiplatelets, hx b/l IHR ~2010 who presents with multiple right ureteral and b/l non obstructing renal stones sp cystoscopy, right ureteral stent insertion 1/12/19, 2 cm right anterior bladder/groin soft tissue nodule sp right ureteroscopy, laser lithotripsy, ureteral stent exchange of two right ureteral stones and two right renal stone 6x26 JJ ureteral stent on a string 2/7/20,\par and sp right URS/LL/ureteral stent insertion 2/14/20 for steinstraasse at level of pelvic brim at level of ~2 cm narrow caliber ureter/ureteral stricture. no typical stricture intraluminal fibrosis or ureteral lesions, therefore patients own anatomy vs edema from chronic stone. \par \par Doing well. did have left flank pain felt like severe renal colic has now resolved.\par Denies hematuria, dysuria.\par \par The 24 hour metabolic workup 2/2020 demonstrates hypocitraturia and low volume urine 1.8 L.\par In 2010 the patient had hypocitraturia as well as hypercalciuria\par \par Old records reviewed including imaging 2010 CT a/p and there was no soft tissue right groin nodule however this was likely pre IHR. Small left non obstructing renal stone\par \par Xray fluoroscopy images visualized demonstrating the narrow ureter 2 cm at pelvic brim\par CT abdomen and pelvis 1/2019 images -and there is a 1.1 cm proximal right ureteral stone along with a second 7 mm distal right ureteral stone. Visible on . +hydro.\par There are bilateral nonobstructing stones in the kidneys (max 4 mm right, 5 mm left)\par There is also a nonspecific 2.3 cm soft tissue density anterior to the urinary bladder and right groin\par \par Denies  PMH including previous kidney stones, recurrent UTIs. \par Family History: No  malignancies\par Soc hx window repair\par \par Cr 0.8 1/2020\par K normal

## 2020-06-14 LAB
ANION GAP SERPL CALC-SCNC: 14 MMOL/L
BUN SERPL-MCNC: 15 MG/DL
CALCIUM SERPL-MCNC: 9.9 MG/DL
CHLORIDE SERPL-SCNC: 100 MMOL/L
CO2 SERPL-SCNC: 26 MMOL/L
CREAT SERPL-MCNC: 1 MG/DL
GLUCOSE SERPL-MCNC: 111 MG/DL
POTASSIUM SERPL-SCNC: 4.7 MMOL/L
SODIUM SERPL-SCNC: 140 MMOL/L

## 2020-06-23 RX ORDER — METOPROLOL SUCCINATE 50 MG/1
50 TABLET, EXTENDED RELEASE ORAL DAILY
Qty: 90 | Refills: 3 | Status: ACTIVE | COMMUNITY
Start: 2019-12-11 | End: 1900-01-01

## 2020-06-25 ENCOUNTER — TRANSCRIPTION ENCOUNTER (OUTPATIENT)
Age: 60
End: 2020-06-25

## 2020-06-27 ENCOUNTER — RESULT REVIEW (OUTPATIENT)
Age: 60
End: 2020-06-27

## 2020-06-27 ENCOUNTER — OUTPATIENT (OUTPATIENT)
Dept: OUTPATIENT SERVICES | Facility: HOSPITAL | Age: 60
LOS: 1 days | Discharge: HOME | End: 2020-06-27
Payer: COMMERCIAL

## 2020-06-27 DIAGNOSIS — Z98.890 OTHER SPECIFIED POSTPROCEDURAL STATES: Chronic | ICD-10-CM

## 2020-06-27 DIAGNOSIS — N20.0 CALCULUS OF KIDNEY: Chronic | ICD-10-CM

## 2020-06-27 DIAGNOSIS — N20.0 CALCULUS OF KIDNEY: ICD-10-CM

## 2020-06-27 PROCEDURE — 74178 CT ABD&PLV WO CNTR FLWD CNTR: CPT | Mod: 26

## 2020-06-30 LAB
INR PPP: 2 RATIO
POCT-PROTHROMBIN TIME: 24.3 SECS

## 2020-07-27 ENCOUNTER — APPOINTMENT (OUTPATIENT)
Dept: UROLOGY | Facility: CLINIC | Age: 60
End: 2020-07-27

## 2020-08-31 ENCOUNTER — RX RENEWAL (OUTPATIENT)
Age: 60
End: 2020-08-31

## 2021-06-04 LAB
ALBUMIN SERPL ELPH-MCNC: 4.5 G/DL
ALP BLD-CCNC: 101 U/L
ALT SERPL-CCNC: 14 U/L
ANION GAP SERPL CALC-SCNC: 13 MMOL/L
AST SERPL-CCNC: 15 U/L
BASOPHILS # BLD AUTO: 0.05 K/UL
BASOPHILS NFR BLD AUTO: 0.8 %
BILIRUB SERPL-MCNC: 0.2 MG/DL
BUN SERPL-MCNC: 16 MG/DL
CALCIUM SERPL-MCNC: 9.8 MG/DL
CHLORIDE SERPL-SCNC: 98 MMOL/L
CHOLEST SERPL-MCNC: 225 MG/DL
CO2 SERPL-SCNC: 26 MMOL/L
CREAT SERPL-MCNC: 0.9 MG/DL
EOSINOPHIL # BLD AUTO: 0.24 K/UL
EOSINOPHIL NFR BLD AUTO: 4 %
ESTIMATED AVERAGE GLUCOSE: 169 MG/DL
GLUCOSE SERPL-MCNC: 153 MG/DL
HBA1C MFR BLD HPLC: 7.5 %
HCT VFR BLD CALC: 36.7 %
HDLC SERPL-MCNC: 28 MG/DL
HGB BLD-MCNC: 11.9 G/DL
IMM GRANULOCYTES NFR BLD AUTO: 0.5 %
LDLC SERPL CALC-MCNC: 97 MG/DL
LYMPHOCYTES # BLD AUTO: 1.92 K/UL
LYMPHOCYTES NFR BLD AUTO: 32.4 %
MAN DIFF?: NORMAL
MCHC RBC-ENTMCNC: 27.9 PG
MCHC RBC-ENTMCNC: 32.4 G/DL
MCV RBC AUTO: 85.9 FL
MONOCYTES # BLD AUTO: 0.35 K/UL
MONOCYTES NFR BLD AUTO: 5.9 %
NEUTROPHILS # BLD AUTO: 3.34 K/UL
NEUTROPHILS NFR BLD AUTO: 56.4 %
NONHDLC SERPL-MCNC: 197 MG/DL
PLATELET # BLD AUTO: 185 K/UL
POTASSIUM SERPL-SCNC: 4.5 MMOL/L
PROT SERPL-MCNC: 7.6 G/DL
RBC # BLD: 4.27 M/UL
RBC # FLD: 14.3 %
SODIUM SERPL-SCNC: 137 MMOL/L
TRIGL SERPL-MCNC: 675 MG/DL
WBC # FLD AUTO: 5.93 K/UL

## 2021-07-08 ENCOUNTER — APPOINTMENT (OUTPATIENT)
Dept: CARDIOLOGY | Facility: CLINIC | Age: 61
End: 2021-07-08
Payer: MEDICARE

## 2021-07-08 VITALS
WEIGHT: 184 LBS | DIASTOLIC BLOOD PRESSURE: 94 MMHG | TEMPERATURE: 97.4 F | HEIGHT: 67 IN | BODY MASS INDEX: 28.88 KG/M2 | HEART RATE: 75 BPM | SYSTOLIC BLOOD PRESSURE: 130 MMHG

## 2021-07-08 DIAGNOSIS — I10 ESSENTIAL (PRIMARY) HYPERTENSION: ICD-10-CM

## 2021-07-08 DIAGNOSIS — E11.9 TYPE 2 DIABETES MELLITUS W/OUT COMPLICATIONS: ICD-10-CM

## 2021-07-08 DIAGNOSIS — E78.5 HYPERLIPIDEMIA, UNSPECIFIED: ICD-10-CM

## 2021-07-08 DIAGNOSIS — M51.27 OTHER INTERVERTEBRAL DISC DISPLACEMENT, LUMBOSACRAL REGION: ICD-10-CM

## 2021-07-08 PROCEDURE — 93000 ELECTROCARDIOGRAM COMPLETE: CPT

## 2021-07-08 PROCEDURE — 99213 OFFICE O/P EST LOW 20 MIN: CPT

## 2021-07-08 PROCEDURE — 99072 ADDL SUPL MATRL&STAF TM PHE: CPT

## 2021-07-08 RX ORDER — AMLODIPINE BESYLATE 5 MG/1
5 TABLET ORAL DAILY
Qty: 90 | Refills: 2 | Status: ACTIVE | COMMUNITY
Start: 2021-07-08 | End: 1900-01-01

## 2021-07-08 NOTE — HISTORY OF PRESENT ILLNESS
[FreeTextEntry1] : pt is not seen for 1 year\par  pt had seizures seen by neuro\par  pt had cerebral aneurysms coil done at [facility] by Dr Cook\par  pt stopped kepra\par  blood work from 6/3/21 reviewed high cholesterol and hgba1c was 7.5\par  pt is not taking any meds

## 2021-07-08 NOTE — ASSESSMENT
[FreeTextEntry1] :  ronald santana g nsr lahb no arrhythmias noted\par  will start [id number] mg po bid\par amlodipine 5 mg

## 2021-07-19 RX ORDER — BLOOD SUGAR DIAGNOSTIC
STRIP MISCELLANEOUS TWICE DAILY
Qty: 100 | Refills: 2 | Status: ACTIVE | COMMUNITY
Start: 2021-07-19 | End: 1900-01-01

## 2021-08-03 DIAGNOSIS — Z01.818 ENCOUNTER FOR OTHER PREPROCEDURAL EXAMINATION: ICD-10-CM

## 2021-08-05 LAB
ABO + RH PNL BLD: NORMAL
ALBUMIN SERPL ELPH-MCNC: 4.5 G/DL
ALP BLD-CCNC: 91 U/L
ALT SERPL-CCNC: 19 U/L
ANION GAP SERPL CALC-SCNC: 15 MMOL/L
APTT BLD: 29.1 SEC
AST SERPL-CCNC: 15 U/L
BASOPHILS # BLD AUTO: 0.06 K/UL
BASOPHILS NFR BLD AUTO: 0.9 %
BILIRUB SERPL-MCNC: 0.3 MG/DL
BUN SERPL-MCNC: 16 MG/DL
CALCIUM SERPL-MCNC: 10 MG/DL
CHLORIDE SERPL-SCNC: 105 MMOL/L
CO2 SERPL-SCNC: 20 MMOL/L
CREAT SERPL-MCNC: 1 MG/DL
EOSINOPHIL # BLD AUTO: 0.21 K/UL
EOSINOPHIL NFR BLD AUTO: 3.1 %
GLUCOSE SERPL-MCNC: 128 MG/DL
HCT VFR BLD CALC: 40.8 %
HGB BLD-MCNC: 13.2 G/DL
IMM GRANULOCYTES NFR BLD AUTO: 0.3 %
INR PPP: 1.07 RATIO
LYMPHOCYTES # BLD AUTO: 1.99 K/UL
LYMPHOCYTES NFR BLD AUTO: 29.5 %
MAN DIFF?: NORMAL
MCHC RBC-ENTMCNC: 29.4 PG
MCHC RBC-ENTMCNC: 32.4 G/DL
MCV RBC AUTO: 90.9 FL
MONOCYTES # BLD AUTO: 0.39 K/UL
MONOCYTES NFR BLD AUTO: 5.8 %
NEUTROPHILS # BLD AUTO: 4.08 K/UL
NEUTROPHILS NFR BLD AUTO: 60.4 %
PLATELET # BLD AUTO: 197 K/UL
POTASSIUM SERPL-SCNC: 4.4 MMOL/L
PROT SERPL-MCNC: 8.1 G/DL
PT BLD: 12.3 SEC
RBC # BLD: 4.49 M/UL
RBC # FLD: 14.8 %
SODIUM SERPL-SCNC: 140 MMOL/L
WBC # FLD AUTO: 6.75 K/UL

## 2021-10-06 PROBLEM — I10 ESSENTIAL HYPERTENSION: Status: ACTIVE | Noted: 2019-03-28

## 2021-10-21 ENCOUNTER — APPOINTMENT (OUTPATIENT)
Dept: CARDIOLOGY | Facility: CLINIC | Age: 61
End: 2021-10-21

## 2022-02-28 ENCOUNTER — OUTPATIENT (OUTPATIENT)
Dept: OUTPATIENT SERVICES | Facility: HOSPITAL | Age: 62
LOS: 1 days | Discharge: HOME | End: 2022-02-28

## 2022-02-28 ENCOUNTER — APPOINTMENT (OUTPATIENT)
Dept: NEUROPSYCHOLOGY | Facility: CLINIC | Age: 62
End: 2022-02-28

## 2022-02-28 DIAGNOSIS — I63.9 CEREBRAL INFARCTION, UNSPECIFIED: ICD-10-CM

## 2022-02-28 DIAGNOSIS — N20.0 CALCULUS OF KIDNEY: Chronic | ICD-10-CM

## 2022-02-28 DIAGNOSIS — Z98.890 OTHER SPECIFIED POSTPROCEDURAL STATES: Chronic | ICD-10-CM

## 2022-03-02 ENCOUNTER — OUTPATIENT (OUTPATIENT)
Dept: OUTPATIENT SERVICES | Facility: HOSPITAL | Age: 62
LOS: 1 days | Discharge: HOME | End: 2022-03-02

## 2022-03-02 DIAGNOSIS — Z98.890 OTHER SPECIFIED POSTPROCEDURAL STATES: Chronic | ICD-10-CM

## 2022-03-02 DIAGNOSIS — N20.0 CALCULUS OF KIDNEY: Chronic | ICD-10-CM

## 2022-03-02 DIAGNOSIS — R47.1 DYSARTHRIA AND ANARTHRIA: ICD-10-CM

## 2022-03-02 DIAGNOSIS — G62.9 POLYNEUROPATHY, UNSPECIFIED: ICD-10-CM

## 2022-03-02 DIAGNOSIS — I63.9 CEREBRAL INFARCTION, UNSPECIFIED: ICD-10-CM

## 2022-03-02 DIAGNOSIS — M79.606 PAIN IN LEG, UNSPECIFIED: ICD-10-CM

## 2022-03-03 DIAGNOSIS — I63.9 CEREBRAL INFARCTION, UNSPECIFIED: ICD-10-CM

## 2022-03-03 DIAGNOSIS — G62.9 POLYNEUROPATHY, UNSPECIFIED: ICD-10-CM

## 2022-03-03 DIAGNOSIS — M79.606 PAIN IN LEG, UNSPECIFIED: ICD-10-CM

## 2022-03-07 ENCOUNTER — APPOINTMENT (OUTPATIENT)
Dept: NEUROPSYCHOLOGY | Facility: CLINIC | Age: 62
End: 2022-03-07

## 2022-03-21 ENCOUNTER — APPOINTMENT (OUTPATIENT)
Dept: NEUROPSYCHOLOGY | Facility: CLINIC | Age: 62
End: 2022-03-21

## 2022-03-28 ENCOUNTER — APPOINTMENT (OUTPATIENT)
Dept: NEUROPSYCHOLOGY | Facility: CLINIC | Age: 62
End: 2022-03-28

## 2022-04-15 NOTE — ED ADULT NURSE NOTE - NS ED NOTE  TALK SOMEONE YN
Final Anesthesia Post-op Assessment    Patient: Yomaira Arceo  Procedure(s) Performed: REVISION, TOTAL ARTHROPLASTY, HIP - RIGHT  Anesthesia type: General    Vitals Value Taken Time   Temp  04/15/22 1410   Pulse 51 04/15/22 1409   Resp 16 04/15/22 1409   SpO2 100 % 04/15/22 1409   /95 04/15/22 1407   Vitals shown include unvalidated device data.      Patient Location: PACU Phase 1  Post-op Vital Signs:stable  Level of Consciousness: participates in exam and awake  Respiratory Status: spontaneous ventilation and unassisted  Cardiovascular blood pressure returned to baseline  Hydration: euvolemic  Pain Management: adequately controlled  Handoff: Handoff to receiving clinician was performed and questions were answered  Vomiting: none  Nausea: None  Airway Patency:patent  Post-op Assessment: awake, alert, appropriately conversant, or baseline, no complications, patient tolerated procedure well with no complications, no evidence of recall, regional anesthetic in place - able to participate, dentition within defined limits, moving all extremities and No Corneal Abrasion      No complications documented.   
No

## 2022-04-20 ENCOUNTER — OUTPATIENT (OUTPATIENT)
Dept: OUTPATIENT SERVICES | Facility: HOSPITAL | Age: 62
LOS: 1 days | Discharge: HOME | End: 2022-04-20

## 2022-04-20 DIAGNOSIS — Z98.890 OTHER SPECIFIED POSTPROCEDURAL STATES: Chronic | ICD-10-CM

## 2022-04-20 DIAGNOSIS — N20.0 CALCULUS OF KIDNEY: Chronic | ICD-10-CM

## 2022-04-26 DIAGNOSIS — I63.9 CEREBRAL INFARCTION, UNSPECIFIED: ICD-10-CM

## 2022-05-24 NOTE — ED ADULT NURSE NOTE - PAIN: PRESENCE, MLM
Pt is leery, but agreeable to trying 20mg. He states he tried 20mg about 10 years ago and it did not work for him. Please send for #30 to pharmacy; I already cancelled 40mg Rx that was sent. Did not pend; unsure if the 20mg comes in delayed release. complains of pain/discomfort

## 2022-06-29 NOTE — ED PROVIDER NOTE - CADM POA CENTRAL LINE
Encounter addended by: Candelaria Joseph RN on: 6/29/2022 4:06 PM   Actions taken: Flowsheet accepted, Clinical Note Signed No

## 2022-12-19 ENCOUNTER — APPOINTMENT (OUTPATIENT)
Dept: CARDIOTHORACIC SURGERY | Facility: CLINIC | Age: 62
End: 2022-12-19

## 2022-12-19 VITALS — WEIGHT: 184 LBS | BODY MASS INDEX: 28.88 KG/M2 | HEIGHT: 67 IN

## 2022-12-19 DIAGNOSIS — Z87.891 PERSONAL HISTORY OF NICOTINE DEPENDENCE: ICD-10-CM

## 2022-12-19 PROCEDURE — G0296 VISIT TO DETERM LDCT ELIG: CPT

## 2022-12-19 NOTE — HISTORY OF PRESENT ILLNESS
[Home] : at home, [unfilled] , at the time of the visit. [Medical Office: ([facility])___] : at the medical office located in  [Verbal consent obtained from patient] : the patient, [unfilled] [Former] : Former [>=20 Pack-Years] : Twenty pack years or greater smoking history: Yes [TextBox_13] : Mr. TRACY TOSCANO is a 62 year old man with a history of HTN, DM, HLD\par He was seen in the office by Dr. Dayami Richardson for review of eligibility for, as well as, discussion of Low-Dose CT lung cancer screening program. Over the telephone today we reviewed and confirmed that the patient meets screening eligibility criteria:\par -Age: 62 year \par Smoking status:\par -Former smoker\par -Number of pack(s) per day: 2\par -Number of years smoked: 20\par -Number of pack years smokin\par -Number of years since quitting smoking: 3\par -Quit year: 2019\par Mr. TOSCANO denies any signs or symptoms of lung cancer including new cough, change in cough, hemoptysis and unintentional weight loss. \par Mr. TOSCANO denies any personal history of lung cancer. No lung cancer in a 1st degree relative. Denies any history of lung disease. Denies any history of occupational exposures\par \par  [YearQuit] : 2019 [PacksperDay] : 2 [N_Years] : 20 [PacksperYear] : 40

## 2023-01-04 ENCOUNTER — OUTPATIENT (OUTPATIENT)
Dept: OUTPATIENT SERVICES | Facility: HOSPITAL | Age: 63
LOS: 1 days | Discharge: HOME | End: 2023-01-04

## 2023-01-04 DIAGNOSIS — I63.9 CEREBRAL INFARCTION, UNSPECIFIED: ICD-10-CM

## 2023-01-04 DIAGNOSIS — M54.50 LOW BACK PAIN, UNSPECIFIED: ICD-10-CM

## 2023-01-04 DIAGNOSIS — Z98.890 OTHER SPECIFIED POSTPROCEDURAL STATES: Chronic | ICD-10-CM

## 2023-01-04 DIAGNOSIS — M79.606 PAIN IN LEG, UNSPECIFIED: ICD-10-CM

## 2023-01-04 DIAGNOSIS — G62.9 POLYNEUROPATHY, UNSPECIFIED: ICD-10-CM

## 2023-01-04 DIAGNOSIS — N20.0 CALCULUS OF KIDNEY: Chronic | ICD-10-CM

## 2023-01-27 ENCOUNTER — OUTPATIENT (OUTPATIENT)
Dept: OUTPATIENT SERVICES | Facility: HOSPITAL | Age: 63
LOS: 1 days | End: 2023-01-27

## 2023-01-27 DIAGNOSIS — Z98.890 OTHER SPECIFIED POSTPROCEDURAL STATES: Chronic | ICD-10-CM

## 2023-01-27 DIAGNOSIS — N20.0 CALCULUS OF KIDNEY: Chronic | ICD-10-CM

## 2023-02-08 ENCOUNTER — OUTPATIENT (OUTPATIENT)
Dept: OUTPATIENT SERVICES | Facility: HOSPITAL | Age: 63
LOS: 1 days | End: 2023-02-08
Payer: COMMERCIAL

## 2023-02-08 DIAGNOSIS — N20.0 CALCULUS OF KIDNEY: Chronic | ICD-10-CM

## 2023-02-08 DIAGNOSIS — Z00.00 ENCOUNTER FOR GENERAL ADULT MEDICAL EXAMINATION WITHOUT ABNORMAL FINDINGS: ICD-10-CM

## 2023-02-08 DIAGNOSIS — Z98.890 OTHER SPECIFIED POSTPROCEDURAL STATES: Chronic | ICD-10-CM

## 2023-02-08 PROCEDURE — 97110 THERAPEUTIC EXERCISES: CPT | Mod: GP

## 2023-02-09 DIAGNOSIS — Z00.00 ENCOUNTER FOR GENERAL ADULT MEDICAL EXAMINATION WITHOUT ABNORMAL FINDINGS: ICD-10-CM

## 2023-02-10 ENCOUNTER — OUTPATIENT (OUTPATIENT)
Dept: OUTPATIENT SERVICES | Facility: HOSPITAL | Age: 63
LOS: 1 days | End: 2023-02-10

## 2023-02-10 DIAGNOSIS — N20.0 CALCULUS OF KIDNEY: Chronic | ICD-10-CM

## 2023-02-10 DIAGNOSIS — Z98.890 OTHER SPECIFIED POSTPROCEDURAL STATES: Chronic | ICD-10-CM

## 2023-02-13 DIAGNOSIS — Z00.00 ENCOUNTER FOR GENERAL ADULT MEDICAL EXAMINATION WITHOUT ABNORMAL FINDINGS: ICD-10-CM

## 2023-02-14 ENCOUNTER — APPOINTMENT (OUTPATIENT)
Dept: UROLOGY | Facility: CLINIC | Age: 63
End: 2023-02-14
Payer: MEDICAID

## 2023-02-14 VITALS
RESPIRATION RATE: 18 BRPM | HEART RATE: 67 BPM | DIASTOLIC BLOOD PRESSURE: 76 MMHG | OXYGEN SATURATION: 98 % | BODY MASS INDEX: 29.82 KG/M2 | TEMPERATURE: 97.8 F | SYSTOLIC BLOOD PRESSURE: 125 MMHG | HEIGHT: 67 IN | WEIGHT: 190 LBS

## 2023-02-14 DIAGNOSIS — Z80.1 FAMILY HISTORY OF MALIGNANT NEOPLASM OF TRACHEA, BRONCHUS AND LUNG: ICD-10-CM

## 2023-02-14 DIAGNOSIS — N39.0 URINARY TRACT INFECTION, SITE NOT SPECIFIED: ICD-10-CM

## 2023-02-14 DIAGNOSIS — Z82.49 FAMILY HISTORY OF ISCHEMIC HEART DISEASE AND OTHER DISEASES OF THE CIRCULATORY SYSTEM: ICD-10-CM

## 2023-02-14 DIAGNOSIS — G45.9 TRANSIENT CEREBRAL ISCHEMIC ATTACK, UNSPECIFIED: ICD-10-CM

## 2023-02-14 DIAGNOSIS — Z87.891 PERSONAL HISTORY OF NICOTINE DEPENDENCE: ICD-10-CM

## 2023-02-14 DIAGNOSIS — G04.90 ENCEPHALITIS AND ENCEPHALOMYELITIS, UNSPECIFIED: ICD-10-CM

## 2023-02-14 DIAGNOSIS — Z83.3 FAMILY HISTORY OF DIABETES MELLITUS: ICD-10-CM

## 2023-02-14 DIAGNOSIS — Z00.00 ENCOUNTER FOR GENERAL ADULT MEDICAL EXAMINATION W/OUT ABNORMAL FINDINGS: ICD-10-CM

## 2023-02-14 DIAGNOSIS — E11.9 TYPE 2 DIABETES MELLITUS W/OUT COMPLICATIONS: ICD-10-CM

## 2023-02-14 DIAGNOSIS — A49.9 URINARY TRACT INFECTION, SITE NOT SPECIFIED: ICD-10-CM

## 2023-02-14 DIAGNOSIS — Z86.79 PERSONAL HISTORY OF OTHER DISEASES OF THE CIRCULATORY SYSTEM: ICD-10-CM

## 2023-02-14 PROCEDURE — 99214 OFFICE O/P EST MOD 30 MIN: CPT

## 2023-02-14 RX ORDER — ICOSAPENT ETHYL 1000 MG/1
1 CAPSULE ORAL DAILY
Refills: 0 | Status: COMPLETED | COMMUNITY
End: 2023-02-14

## 2023-02-14 RX ORDER — ALPRAZOLAM 0.25 MG/1
0.25 TABLET ORAL
Refills: 0 | Status: COMPLETED | COMMUNITY
End: 2023-02-14

## 2023-02-14 RX ORDER — PRAVASTATIN SODIUM 40 MG/1
40 TABLET ORAL
Qty: 90 | Refills: 2 | Status: COMPLETED | COMMUNITY
Start: 2021-07-08 | End: 2023-02-14

## 2023-02-14 RX ORDER — SULFAMETHOXAZOLE AND TRIMETHOPRIM 800; 160 MG/1; MG/1
800-160 TABLET ORAL
Qty: 4 | Refills: 0 | Status: COMPLETED | COMMUNITY
Start: 2020-02-14 | End: 2023-02-14

## 2023-02-14 RX ORDER — AMLODIPINE BESYLATE 10 MG/1
10 TABLET ORAL DAILY
Qty: 90 | Refills: 3 | Status: COMPLETED | COMMUNITY
End: 2023-02-14

## 2023-02-14 RX ORDER — SULFAMETHOXAZOLE AND TRIMETHOPRIM 400; 80 MG/1; MG/1
400-80 TABLET ORAL
Qty: 2 | Refills: 0 | Status: COMPLETED | COMMUNITY
Start: 2020-02-27 | End: 2023-02-14

## 2023-02-14 RX ORDER — ATORVASTATIN CALCIUM 80 MG/1
TABLET, FILM COATED ORAL
Refills: 0 | Status: ACTIVE | COMMUNITY

## 2023-02-14 RX ORDER — PRAVASTATIN SODIUM 40 MG/1
40 TABLET ORAL
Qty: 30 | Refills: 2 | Status: COMPLETED | COMMUNITY
Start: 2019-12-12 | End: 2023-02-14

## 2023-02-14 RX ORDER — MELOXICAM 15 MG/1
TABLET ORAL
Refills: 0 | Status: ACTIVE | COMMUNITY

## 2023-02-14 RX ORDER — ALPRAZOLAM 0.25 MG/1
0.25 TABLET ORAL
Qty: 60 | Refills: 0 | Status: COMPLETED | COMMUNITY
Start: 2020-06-23 | End: 2023-02-14

## 2023-02-14 RX ORDER — LIDOCAINE 50 MG/G
5 CREAM TOPICAL
Refills: 0 | Status: ACTIVE | COMMUNITY

## 2023-02-14 RX ORDER — OMEPRAZOLE 20 MG/1
20 CAPSULE, DELAYED RELEASE ORAL
Refills: 0 | Status: ACTIVE | COMMUNITY

## 2023-02-14 RX ORDER — SITAGLIPTIN 100 MG/1
TABLET, FILM COATED ORAL
Refills: 0 | Status: ACTIVE | COMMUNITY

## 2023-02-14 RX ORDER — DICLOFENAC SODIUM 10 MG/G
1 GEL TOPICAL
Refills: 0 | Status: ACTIVE | COMMUNITY

## 2023-02-14 NOTE — PHYSICAL EXAM
[General Appearance - Well Developed] : well developed [General Appearance - Well Nourished] : well nourished [Normal Appearance] : normal appearance [Well Groomed] : well groomed [General Appearance - In No Acute Distress] : no acute distress [Respiration, Rhythm And Depth] : normal respiratory rhythm and effort [Exaggerated Use Of Accessory Muscles For Inspiration] : no accessory muscle use [Abdomen Soft] : soft [Abdomen Tenderness] : non-tender [Costovertebral Angle Tenderness] : no ~M costovertebral angle tenderness [Urinary Bladder Findings] : the bladder was normal on palpation [Normal Station and Gait] : the gait and station were normal for the patient's age [] : no rash [Oriented To Time, Place, And Person] : oriented to person, place, and time [Affect] : the affect was normal [Mood] : the mood was normal [Not Anxious] : not anxious

## 2023-02-14 NOTE — HISTORY OF PRESENT ILLNESS
[FreeTextEntry1] : TRACY TOSCANO is a 62 year old male, with hx of uncontrolled type 2 diabetes, b/l IHR ~2010 who presents with multiple right ureteral and b/l non obstructing renal stones sp cystoscopy, right ureteral stent insertion 1/12/19, 2 cm right anterior bladder/groin soft tissue nodule sp right ureteroscopy, laser lithotripsy, ureteral stent exchange of two right ureteral stones and two right renal stone 6x26 JJ ureteral stent on a string 2/7/20,and sp right URS/LL/ureteral stent insertion 2/14/20 for steinstraasse at level of pelvic brim at level of ~2 cm narrow caliber ureter/ureteral stricture. no typical stricture intraluminal fibrosis or ureteral lesions, therefore patients own anatomy vs edema from chronic stone. \par \par Patient placed on potassium citrate and instructed to obtain CT urogram however, lost his insurance and could not follow-up for review.\par \par Patient suffered CVA x2, 1 prior to and 1 after endovascular brain aneurysm coil procedure at [facility].  He has been managed by them since and has not had any follow-up imaging or issues with his stones.\par \par PCP ran urine testing which demonstrated Klebsiella/E. coli urinary tract infection.  Patient was asymptomatic however, was instructed to follow-up with urologist for further evaluation.\par \par Patient is currently asymptomatic denies dysuria or changes in urination.\par \par Sonographic postvoid residual 3 cc\par Urine dip negative\par \par CBC demonstrates slight anemia\par BMP demonstrates elevated blood glucose level 198\par Creatinine 0.95 with estimated GFR of 91\par Calcium 9.9\par Hemoglobin A1c 8.4\par \par Previously:\par The 24 hour metabolic workup 2/2020 demonstrates hypocitraturia and low volume urine 1.8 L.\par In 2010 the patient had hypocitraturia as well as hypercalciuria\par \par Old records reviewed including imaging 2010 CT a/p and there was no soft tissue right groin nodule however this was likely pre IHR. Small left non obstructing renal stone\par \par Xray fluoroscopy images demonstrating the narrow ureter 2 cm at pelvic brim\par CT abdomen and pelvis 1/2019 images -and there is a 1.1 cm proximal right ureteral stone along with a second 7 mm distal right ureteral stone. Visible on . +hydro.\par There are bilateral nonobstructing stones in the kidneys (max 4 mm right, 5 mm left)\par There is also a nonspecific 2.3 cm soft tissue density anterior to the urinary bladder and right groin\par \par Denies  PMH including previous kidney stones, recurrent UTIs. \par Family History: No  malignancies\par Soc hx window repair\par \par Cr 0.8 1/2020\par K normal \par

## 2023-02-14 NOTE — ASSESSMENT
[FreeTextEntry1] : TRACY TOSCANO is a 62 year old male, with hx of uncontrolled type 2 diabetes, b/l IHR ~2010 who presents with multiple right ureteral and b/l non obstructing renal stones sp cystoscopy, right ureteral stent insertion 1/12/19, 2 cm right anterior bladder/groin soft tissue nodule sp right ureteroscopy, laser lithotripsy, ureteral stent exchange of two right ureteral stones and two right renal stone 6x26 JJ ureteral stent on a string 2/7/20,and sp right URS/LL/ureteral stent insertion 2/14/20 for steinstraasse at level of pelvic brim at level of ~2 cm narrow caliber ureter/ureteral stricture. no typical stricture intraluminal fibrosis or ureteral lesions, therefore patients own anatomy vs edema from chronic stone.  \par \par Now with asymptomatic bacteria cephalosporin sensitive.  Urine dip negative, PVR 3 cc.\par \par Plan:\par -UA C&S\par -Signs/symptoms of urinary tract infection reviewed with him and son and if they experience the symptoms they know to contact the office/go to emergency room immediately\par -CT Noncon and KUB x-ray, for evaluation of history of stones\par -PSA\par -Follow-up to review\par

## 2023-02-15 ENCOUNTER — OUTPATIENT (OUTPATIENT)
Dept: OUTPATIENT SERVICES | Facility: HOSPITAL | Age: 63
LOS: 1 days | End: 2023-02-15

## 2023-02-15 DIAGNOSIS — Z98.890 OTHER SPECIFIED POSTPROCEDURAL STATES: Chronic | ICD-10-CM

## 2023-02-15 DIAGNOSIS — N20.0 CALCULUS OF KIDNEY: Chronic | ICD-10-CM

## 2023-02-15 DIAGNOSIS — Z00.00 ENCOUNTER FOR GENERAL ADULT MEDICAL EXAMINATION WITHOUT ABNORMAL FINDINGS: ICD-10-CM

## 2023-02-16 ENCOUNTER — NON-APPOINTMENT (OUTPATIENT)
Age: 63
End: 2023-02-16

## 2023-02-16 LAB
BACTERIA UR CULT: NORMAL
BILIRUB UR QL STRIP: NORMAL
COLLECTION METHOD: NORMAL
GLUCOSE UR-MCNC: NORMAL
HCG UR QL: 0.2 EU/DL
HGB UR QL STRIP.AUTO: NORMAL
KETONES UR-MCNC: NORMAL
LEUKOCYTE ESTERASE UR QL STRIP: NORMAL
NITRITE UR QL STRIP: NORMAL
PH UR STRIP: 6
PROT UR STRIP-MCNC: NORMAL
SP GR UR STRIP: 1.02

## 2023-02-17 ENCOUNTER — OUTPATIENT (OUTPATIENT)
Dept: OUTPATIENT SERVICES | Facility: HOSPITAL | Age: 63
LOS: 1 days | End: 2023-02-17

## 2023-02-17 DIAGNOSIS — Z98.890 OTHER SPECIFIED POSTPROCEDURAL STATES: Chronic | ICD-10-CM

## 2023-02-17 DIAGNOSIS — N20.0 CALCULUS OF KIDNEY: Chronic | ICD-10-CM

## 2023-02-17 DIAGNOSIS — Z00.00 ENCOUNTER FOR GENERAL ADULT MEDICAL EXAMINATION WITHOUT ABNORMAL FINDINGS: ICD-10-CM

## 2023-02-22 ENCOUNTER — OUTPATIENT (OUTPATIENT)
Dept: OUTPATIENT SERVICES | Facility: HOSPITAL | Age: 63
LOS: 1 days | End: 2023-02-22

## 2023-02-22 DIAGNOSIS — N20.0 CALCULUS OF KIDNEY: Chronic | ICD-10-CM

## 2023-02-22 DIAGNOSIS — Z00.00 ENCOUNTER FOR GENERAL ADULT MEDICAL EXAMINATION WITHOUT ABNORMAL FINDINGS: ICD-10-CM

## 2023-02-22 DIAGNOSIS — Z98.890 OTHER SPECIFIED POSTPROCEDURAL STATES: Chronic | ICD-10-CM

## 2023-03-01 ENCOUNTER — OUTPATIENT (OUTPATIENT)
Dept: OUTPATIENT SERVICES | Facility: HOSPITAL | Age: 63
LOS: 1 days | End: 2023-03-01
Payer: MEDICAID

## 2023-03-01 DIAGNOSIS — Z98.890 OTHER SPECIFIED POSTPROCEDURAL STATES: Chronic | ICD-10-CM

## 2023-03-01 DIAGNOSIS — M54.50 LOW BACK PAIN, UNSPECIFIED: ICD-10-CM

## 2023-03-01 DIAGNOSIS — Z00.00 ENCOUNTER FOR GENERAL ADULT MEDICAL EXAMINATION WITHOUT ABNORMAL FINDINGS: ICD-10-CM

## 2023-03-01 DIAGNOSIS — N20.0 CALCULUS OF KIDNEY: Chronic | ICD-10-CM

## 2023-03-01 PROCEDURE — 97110 THERAPEUTIC EXERCISES: CPT | Mod: GP

## 2023-03-03 ENCOUNTER — OUTPATIENT (OUTPATIENT)
Dept: OUTPATIENT SERVICES | Facility: HOSPITAL | Age: 63
LOS: 1 days | End: 2023-03-03
Payer: COMMERCIAL

## 2023-03-03 DIAGNOSIS — Z00.00 ENCOUNTER FOR GENERAL ADULT MEDICAL EXAMINATION WITHOUT ABNORMAL FINDINGS: ICD-10-CM

## 2023-03-03 DIAGNOSIS — Z98.890 OTHER SPECIFIED POSTPROCEDURAL STATES: Chronic | ICD-10-CM

## 2023-03-03 DIAGNOSIS — N20.0 CALCULUS OF KIDNEY: Chronic | ICD-10-CM

## 2023-03-08 ENCOUNTER — OUTPATIENT (OUTPATIENT)
Dept: OUTPATIENT SERVICES | Facility: HOSPITAL | Age: 63
LOS: 1 days | End: 2023-03-08

## 2023-03-08 DIAGNOSIS — Z98.890 OTHER SPECIFIED POSTPROCEDURAL STATES: Chronic | ICD-10-CM

## 2023-03-08 DIAGNOSIS — N20.0 CALCULUS OF KIDNEY: Chronic | ICD-10-CM

## 2023-03-08 DIAGNOSIS — Z00.00 ENCOUNTER FOR GENERAL ADULT MEDICAL EXAMINATION WITHOUT ABNORMAL FINDINGS: ICD-10-CM

## 2023-03-15 ENCOUNTER — OUTPATIENT (OUTPATIENT)
Dept: OUTPATIENT SERVICES | Facility: HOSPITAL | Age: 63
LOS: 1 days | End: 2023-03-15

## 2023-03-15 DIAGNOSIS — M54.50 LOW BACK PAIN, UNSPECIFIED: ICD-10-CM

## 2023-03-15 DIAGNOSIS — Z98.890 OTHER SPECIFIED POSTPROCEDURAL STATES: Chronic | ICD-10-CM

## 2023-03-15 DIAGNOSIS — Z00.00 ENCOUNTER FOR GENERAL ADULT MEDICAL EXAMINATION WITHOUT ABNORMAL FINDINGS: ICD-10-CM

## 2023-03-15 DIAGNOSIS — N20.0 CALCULUS OF KIDNEY: Chronic | ICD-10-CM

## 2023-03-17 ENCOUNTER — OUTPATIENT (OUTPATIENT)
Dept: OUTPATIENT SERVICES | Facility: HOSPITAL | Age: 63
LOS: 1 days | End: 2023-03-17

## 2023-03-17 DIAGNOSIS — N20.0 CALCULUS OF KIDNEY: Chronic | ICD-10-CM

## 2023-03-17 DIAGNOSIS — M54.50 LOW BACK PAIN, UNSPECIFIED: ICD-10-CM

## 2023-03-17 DIAGNOSIS — Z00.00 ENCOUNTER FOR GENERAL ADULT MEDICAL EXAMINATION WITHOUT ABNORMAL FINDINGS: ICD-10-CM

## 2023-03-17 DIAGNOSIS — Z98.890 OTHER SPECIFIED POSTPROCEDURAL STATES: Chronic | ICD-10-CM

## 2023-03-19 ENCOUNTER — NON-APPOINTMENT (OUTPATIENT)
Age: 63
End: 2023-03-19

## 2023-03-20 ENCOUNTER — EMERGENCY (EMERGENCY)
Facility: HOSPITAL | Age: 63
LOS: 0 days | Discharge: HOME | End: 2023-03-20
Attending: EMERGENCY MEDICINE
Payer: MEDICAID

## 2023-03-20 VITALS
WEIGHT: 190.04 LBS | SYSTOLIC BLOOD PRESSURE: 129 MMHG | TEMPERATURE: 100 F | DIASTOLIC BLOOD PRESSURE: 81 MMHG | OXYGEN SATURATION: 97 % | HEART RATE: 94 BPM | RESPIRATION RATE: 18 BRPM | HEIGHT: 67 IN

## 2023-03-20 DIAGNOSIS — R21 RASH AND OTHER NONSPECIFIC SKIN ERUPTION: ICD-10-CM

## 2023-03-20 DIAGNOSIS — Z98.890 OTHER SPECIFIED POSTPROCEDURAL STATES: Chronic | ICD-10-CM

## 2023-03-20 DIAGNOSIS — I10 ESSENTIAL (PRIMARY) HYPERTENSION: ICD-10-CM

## 2023-03-20 DIAGNOSIS — Z87.442 PERSONAL HISTORY OF URINARY CALCULI: ICD-10-CM

## 2023-03-20 DIAGNOSIS — E11.9 TYPE 2 DIABETES MELLITUS WITHOUT COMPLICATIONS: ICD-10-CM

## 2023-03-20 DIAGNOSIS — Z79.02 LONG TERM (CURRENT) USE OF ANTITHROMBOTICS/ANTIPLATELETS: ICD-10-CM

## 2023-03-20 DIAGNOSIS — Z79.82 LONG TERM (CURRENT) USE OF ASPIRIN: ICD-10-CM

## 2023-03-20 DIAGNOSIS — Z86.73 PERSONAL HISTORY OF TRANSIENT ISCHEMIC ATTACK (TIA), AND CEREBRAL INFARCTION WITHOUT RESIDUAL DEFICITS: ICD-10-CM

## 2023-03-20 DIAGNOSIS — E78.00 PURE HYPERCHOLESTEROLEMIA, UNSPECIFIED: ICD-10-CM

## 2023-03-20 DIAGNOSIS — N20.0 CALCULUS OF KIDNEY: Chronic | ICD-10-CM

## 2023-03-20 DIAGNOSIS — Z79.84 LONG TERM (CURRENT) USE OF ORAL HYPOGLYCEMIC DRUGS: ICD-10-CM

## 2023-03-20 DIAGNOSIS — Z87.19 PERSONAL HISTORY OF OTHER DISEASES OF THE DIGESTIVE SYSTEM: ICD-10-CM

## 2023-03-20 DIAGNOSIS — Z86.69 PERSONAL HISTORY OF OTHER DISEASES OF THE NERVOUS SYSTEM AND SENSE ORGANS: ICD-10-CM

## 2023-03-20 LAB
ALBUMIN SERPL ELPH-MCNC: 3.8 G/DL — SIGNIFICANT CHANGE UP (ref 3.5–5.2)
ALP SERPL-CCNC: 83 U/L — SIGNIFICANT CHANGE UP (ref 30–115)
ALT FLD-CCNC: 43 U/L — HIGH (ref 0–41)
ANION GAP SERPL CALC-SCNC: 12 MMOL/L — SIGNIFICANT CHANGE UP (ref 7–14)
AST SERPL-CCNC: 68 U/L — HIGH (ref 0–41)
BASOPHILS # BLD AUTO: 0.02 K/UL — SIGNIFICANT CHANGE UP (ref 0–0.2)
BASOPHILS NFR BLD AUTO: 0.4 % — SIGNIFICANT CHANGE UP (ref 0–1)
BILIRUB SERPL-MCNC: 0.3 MG/DL — SIGNIFICANT CHANGE UP (ref 0.2–1.2)
BUN SERPL-MCNC: 21 MG/DL — HIGH (ref 10–20)
CALCIUM SERPL-MCNC: 9.3 MG/DL — SIGNIFICANT CHANGE UP (ref 8.4–10.5)
CHLORIDE SERPL-SCNC: 101 MMOL/L — SIGNIFICANT CHANGE UP (ref 98–110)
CO2 SERPL-SCNC: 22 MMOL/L — SIGNIFICANT CHANGE UP (ref 17–32)
CREAT SERPL-MCNC: 1 MG/DL — SIGNIFICANT CHANGE UP (ref 0.7–1.5)
EGFR: 86 ML/MIN/1.73M2 — SIGNIFICANT CHANGE UP
EOSINOPHIL # BLD AUTO: 0.19 K/UL — SIGNIFICANT CHANGE UP (ref 0–0.7)
EOSINOPHIL NFR BLD AUTO: 3.7 % — SIGNIFICANT CHANGE UP (ref 0–8)
GLUCOSE SERPL-MCNC: 280 MG/DL — HIGH (ref 70–99)
HCT VFR BLD CALC: 35.9 % — LOW (ref 42–52)
HGB BLD-MCNC: 12.2 G/DL — LOW (ref 14–18)
IMM GRANULOCYTES NFR BLD AUTO: 0.6 % — HIGH (ref 0.1–0.3)
LYMPHOCYTES # BLD AUTO: 1.57 K/UL — SIGNIFICANT CHANGE UP (ref 1.2–3.4)
LYMPHOCYTES # BLD AUTO: 30.3 % — SIGNIFICANT CHANGE UP (ref 20.5–51.1)
MCHC RBC-ENTMCNC: 30.5 PG — SIGNIFICANT CHANGE UP (ref 27–31)
MCHC RBC-ENTMCNC: 34 G/DL — SIGNIFICANT CHANGE UP (ref 32–37)
MCV RBC AUTO: 89.8 FL — SIGNIFICANT CHANGE UP (ref 80–94)
MONOCYTES # BLD AUTO: 0.35 K/UL — SIGNIFICANT CHANGE UP (ref 0.1–0.6)
MONOCYTES NFR BLD AUTO: 6.8 % — SIGNIFICANT CHANGE UP (ref 1.7–9.3)
NEUTROPHILS # BLD AUTO: 3.02 K/UL — SIGNIFICANT CHANGE UP (ref 1.4–6.5)
NEUTROPHILS NFR BLD AUTO: 58.2 % — SIGNIFICANT CHANGE UP (ref 42.2–75.2)
NRBC # BLD: 0 /100 WBCS — SIGNIFICANT CHANGE UP (ref 0–0)
PLATELET # BLD AUTO: 140 K/UL — SIGNIFICANT CHANGE UP (ref 130–400)
POTASSIUM SERPL-MCNC: 6.4 MMOL/L — CRITICAL HIGH (ref 3.5–5)
POTASSIUM SERPL-SCNC: 6.4 MMOL/L — CRITICAL HIGH (ref 3.5–5)
PROT SERPL-MCNC: 7.1 G/DL — SIGNIFICANT CHANGE UP (ref 6–8)
RBC # BLD: 4 M/UL — LOW (ref 4.7–6.1)
RBC # FLD: 13.4 % — SIGNIFICANT CHANGE UP (ref 11.5–14.5)
SODIUM SERPL-SCNC: 135 MMOL/L — SIGNIFICANT CHANGE UP (ref 135–146)
WBC # BLD: 5.18 K/UL — SIGNIFICANT CHANGE UP (ref 4.8–10.8)
WBC # FLD AUTO: 5.18 K/UL — SIGNIFICANT CHANGE UP (ref 4.8–10.8)

## 2023-03-20 PROCEDURE — 85025 COMPLETE CBC W/AUTO DIFF WBC: CPT

## 2023-03-20 PROCEDURE — 99284 EMERGENCY DEPT VISIT MOD MDM: CPT

## 2023-03-20 PROCEDURE — 36415 COLL VENOUS BLD VENIPUNCTURE: CPT

## 2023-03-20 PROCEDURE — 99283 EMERGENCY DEPT VISIT LOW MDM: CPT

## 2023-03-20 PROCEDURE — 80053 COMPREHEN METABOLIC PANEL: CPT

## 2023-03-20 NOTE — ED PROVIDER NOTE - CLINICAL SUMMARY MEDICAL DECISION MAKING FREE TEXT BOX
Received patient at 1500 AOx4, RA, VSS.   Discharge orders received, patient and  educated on discharge paperwork and medications. Patient and  verbalized understanding. SMITH drains site clean/dry/intact, minimal output noted. PIV removed. Patient left unit under no distress.    62-year-old male presents to the ED for rash on his forehead.  Symptoms appeared 3 days ago, itching in nature.  No pain.  Patient was evaluated by outpatient provider and sent in for evaluation for possible shingles.  Physical exam revealed nonblanching rash.  No vesicles noted no crusting noted.  Patient's son reports symptoms of gotten better after application of steroids.  Additional story of hat use every day.  Patient's current rash sits at the level of the forehead and brow consistent with dermatitis/skin irritation from consistent headway.  Currently denies any fevers, chills, nausea, vomiting.  Labs reviewed by me, values noted to be within normal limits.  Advised continued use of hydrocortisone 1% which the patient has at home.  Benadryl for symptom relief of itching.  Discharged home.

## 2023-03-20 NOTE — ED PROVIDER NOTE - OBJECTIVE STATEMENT
62 year old male with a history of DM, HTN, HLD, CVA 3 years ago, Brain Aneurysm, presents to the ED with rash. Patient notes a rash on bilateral forehead with some vesicles and flanking that is slightly pruritic and burning. States that he was evaluated in [facility] who had concern for shigles. Also notes that he often wears a hat that is rarely washed and rash follows pattern of the brim. Rash does not involve other parts of his body. Denies eye pain, vision changes, mouth sores, nasal sores, fever, chest pain, shortness of breath, nausea, vomiting, diarrhea, dysuria, leg swelling.

## 2023-03-20 NOTE — ED ADULT NURSE NOTE - NSICDXPASTSURGICALHX_GEN_ALL_CORE_FT
PAST SURGICAL HISTORY:  History of hernia repair umbilical & b/l inguinal    History of surgery brain aneurysm repair with stent & coil    Kidney stone lithotripsy & eswal

## 2023-03-20 NOTE — ED PROVIDER NOTE - PHYSICAL EXAMINATION
Physical Exam    Constitutional: No acute distress.   Eyes: Conjunctiva pink, Sclera clear, PERRLA, EOMI.  ENT: No sinus tenderness. No nasal discharge. No oropharyngeal erythema, edema, or exudates. Uvula midline.   Cardiovascular: Regular rate, regular rhythm. No noted murmurs rubs or gallops.  Respiratory: unlabored respiratory effort, clear to auscultation bilaterally no wheezing, rales or rhonchi  Gastrointestinal: Normal bowel sounds. soft, non distended, non-tender abdomen.   Musculoskeletal: supple neck, no midline tenderness. No joint or bony deformity.   Integumentary: warm, dry. Vesicular and flaky rash involving bilateral forehead and temples no purulent drainage.   Neurologic: awake, alert, cranial nerves II-XII grossly intact, extremities’ motor and sensory functions grossly intact  Psychiatric: appropriate mood, appropriate affect

## 2023-03-20 NOTE — ED ADULT NURSE NOTE - NSICDXPASTMEDICALHX_GEN_ALL_CORE_FT
PAST MEDICAL HISTORY:  Brain aneurysm     Diabetes     High cholesterol     History of medical problems former smoker quit 11/2019, yazan    HTN (hypertension)     Kidney stones

## 2023-03-20 NOTE — ED PROVIDER NOTE - PATIENT PORTAL LINK FT
You can access the FollowMyHealth Patient Portal offered by [facility] by registering at the following website: [email or web address]. By joining [employer]’s FollowMyHealth portal, you will also be able to view your health information using other applications (apps) compatible with our system.

## 2023-03-21 NOTE — ASU PATIENT PROFILE, ADULT - NS TRANSFER EYEGLASSES PAIRS
1 pair Preparation Of Recipient Site - Flap Takedown: The eschar and granulation tissue was removed surgically with sharp dissection to facilitate appropriate healing after division and inset of the proximal and distal interpolation flap.

## 2023-03-22 ENCOUNTER — OUTPATIENT (OUTPATIENT)
Dept: OUTPATIENT SERVICES | Facility: HOSPITAL | Age: 63
LOS: 1 days | End: 2023-03-22

## 2023-03-22 DIAGNOSIS — Z00.00 ENCOUNTER FOR GENERAL ADULT MEDICAL EXAMINATION WITHOUT ABNORMAL FINDINGS: ICD-10-CM

## 2023-03-22 DIAGNOSIS — Z98.890 OTHER SPECIFIED POSTPROCEDURAL STATES: Chronic | ICD-10-CM

## 2023-03-22 DIAGNOSIS — M54.50 LOW BACK PAIN, UNSPECIFIED: ICD-10-CM

## 2023-03-22 DIAGNOSIS — N20.0 CALCULUS OF KIDNEY: Chronic | ICD-10-CM

## 2023-03-24 ENCOUNTER — OUTPATIENT (OUTPATIENT)
Dept: OUTPATIENT SERVICES | Facility: HOSPITAL | Age: 63
LOS: 1 days | End: 2023-03-24

## 2023-03-24 DIAGNOSIS — M54.50 LOW BACK PAIN, UNSPECIFIED: ICD-10-CM

## 2023-03-24 DIAGNOSIS — Z98.890 OTHER SPECIFIED POSTPROCEDURAL STATES: Chronic | ICD-10-CM

## 2023-03-24 DIAGNOSIS — N20.0 CALCULUS OF KIDNEY: Chronic | ICD-10-CM

## 2023-03-24 DIAGNOSIS — Z00.00 ENCOUNTER FOR GENERAL ADULT MEDICAL EXAMINATION WITHOUT ABNORMAL FINDINGS: ICD-10-CM

## 2023-03-28 ENCOUNTER — APPOINTMENT (OUTPATIENT)
Dept: UROLOGY | Facility: CLINIC | Age: 63
End: 2023-03-28
Payer: MEDICAID

## 2023-03-28 VITALS — SYSTOLIC BLOOD PRESSURE: 130 MMHG | TEMPERATURE: 98 F | DIASTOLIC BLOOD PRESSURE: 85 MMHG

## 2023-03-28 DIAGNOSIS — N48.1 BALANITIS: ICD-10-CM

## 2023-03-28 DIAGNOSIS — N47.1 PHIMOSIS: ICD-10-CM

## 2023-03-28 PROCEDURE — 99215 OFFICE O/P EST HI 40 MIN: CPT

## 2023-03-28 NOTE — PHYSICAL EXAM
[General Appearance - Well Developed] : well developed [General Appearance - Well Nourished] : well nourished [Normal Appearance] : normal appearance [Well Groomed] : well groomed [General Appearance - In No Acute Distress] : no acute distress [Urethral Meatus] : meatus normal [Penis Abnormality] : normal uncircumcised penis [Urinary Bladder Findings] : the bladder was normal on palpation [Scrotum] : the scrotum was normal [Testes Mass (___cm)] : there were no testicular masses [] : no respiratory distress [Respiration, Rhythm And Depth] : normal respiratory rhythm and effort [Exaggerated Use Of Accessory Muscles For Inspiration] : no accessory muscle use [Oriented To Time, Place, And Person] : oriented to person, place, and time [Affect] : the affect was normal [Mood] : the mood was normal [Not Anxious] : not anxious [Normal Station and Gait] : the gait and station were normal for the patient's age [No Focal Deficits] : no focal deficits [FreeTextEntry1] : Phimosis with balanitis

## 2023-03-28 NOTE — HISTORY OF PRESENT ILLNESS
[FreeTextEntry1] : TRACY TOSCANO is a 62 year old male, with hx of uncontrolled type 2 diabetes, CVA x2 after endovascular brain aneurysm coil procedure at [facility], b/l IHR ~2010 who presents with multiple right ureteral and b/l non obstructing renal stones sp cystoscopy, right ureteral stent insertion 1/12/19, 2 cm right anterior bladder/groin soft tissue nodule sp right ureteroscopy, laser lithotripsy, ureteral stent exchange of two right ureteral stones and two right renal stone 6x26 JJ ureteral stent on a string 2/7/20,and sp right URS/LL/ureteral stent insertion 2/14/20 for steinstraasse at level of pelvic brim at level of ~2 cm narrow caliber ureter/ureteral stricture. no typical stricture intraluminal fibrosis or ureteral lesions, therefore patients own anatomy vs edema from chronic stone. \par \par Imaging from 3/24/2023.  All imaging reviewed with patient and his son\par CT ap demonstrates no hydronephrosis bilaterally.  There is symmetrical bilateral perinephric stranding.  Multiple bilateral renal calculi.  Largest right kidney interpolar region 4 mm largest left kidney 6 mm, Hounsfield units of 124.  No ureteral or bladder calculi present.\par On my read looking at the CT from December 2022 the stone in the right kidney appears to be closer to 6 mm.\par \par KUB x-ray demonstrates bilateral renal calculi, largest on the right is 3 mm left 5 mm.\par \par Urinalysis from 2/16/2023 negative for RBCs or infection\par \par Previously:\par Patient suffered CVA x2, 1 prior to and 1 after endovascular brain aneurysm coil procedure at [facility].  He has been managed by them since and has not had any follow-up imaging or issues with his stones.\par \par Sonographic postvoid residual 3 cc\par Urine dip negative\par \par CBC demonstrates slight anemia\par BMP demonstrates elevated blood glucose level 198\par Creatinine 0.95 with estimated GFR of 91\par Calcium 9.9\par Hemoglobin A1c 8.4\par \par Previously:\par The 24 hour metabolic workup 2/2020 demonstrates hypocitraturia and low volume urine 1.8 L.\par In 2010 the patient had hypocitraturia as well as hypercalciuria\par \par Old records reviewed including imaging 2010 CT a/p and there was no soft tissue right groin nodule however this was likely pre IHR. Small left non obstructing renal stone\par \par Xray fluoroscopy images demonstrating the narrow ureter 2 cm at pelvic brim\par CT abdomen and pelvis 1/2019 images -and there is a 1.1 cm proximal right ureteral stone along with a second 7 mm distal right ureteral stone. Visible on . +hydro.\par There are bilateral nonobstructing stones in the kidneys (max 4 mm right, 5 mm left)\par There is also a nonspecific 2.3 cm soft tissue density anterior to the urinary bladder and right groin\par \par Denies  PMH including previous kidney stones, recurrent UTIs. \par Family History: No  malignancies\par Soc hx window repair\par \par Cr 0.8 1/2020\par K normal \par

## 2023-03-28 NOTE — ASSESSMENT
[FreeTextEntry1] : TRACY TOSCANO is a 62 year old male, with hx of uncontrolled type 2 diabetes, CVA x2 after endovascular brain aneurysm coil procedure at [facility], b/l IHR ~2010 who presents with multiple right ureteral and b/l non obstructing renal stones sp cystoscopy, right ureteral stent insertion 1/12/19, 2 cm right anterior bladder/groin soft tissue nodule sp right ureteroscopy, laser lithotripsy, ureteral stent exchange of two right ureteral stones and two right renal stone 6x26 JJ ureteral stent on a string 2/7/20,and sp right URS/LL/ureteral stent insertion 2/14/20 for steinstraasse at level of pelvic brim at level of ~2 cm narrow caliber ureter/ureteral stricture. no typical stricture intraluminal fibrosis or ureteral lesions, therefore patients own anatomy vs edema from chronic stone.  Phimosis with balanitis.\par \par Both stones appear to be approximately 6 mm, we will start with the left side.\par \par Plan:\par -After thorough review of all the options available he is electing for intervention with ESWL.  All aspects of this procedure were reviewed in detail with regards to preparation, outcomes, and adverse events.  We will start with left ESWL followed by right ESWL.\par -Start clotrimazole twice daily to the affected area.  All usage, dosage, mechanism of action, adverse events were reviewed.\par -PSA\par -Follow-up to review\par \par \par Our stone treatment discussion summarized--\par 1. Surveillance: we discussed risks associated with this approach including increase in size of stone, UTIs, renal obstruction.\par \par 2. URS/LL: we discussed how this is done (transurethrally with small cameras, baskets and a laser), the risks (bleeding, infection, damage to  organs, stricture, inability to access the ureter, stent pain which can be mild, moderate or severe) and benefits (minimally invasive). The patient also understands that if our scopes will not fit into the ureter because the ureter is too small, the patient will be stented and left to dilate with a stent ~2 weeks. We will then re-attempt the ureteroscopy. \par \par 3. ESWL: we discussed how this procedure is performed (transcorporeal shock waves under light anesthesia), the risks (bleeding, failure to fragment stones, steinstrasse) and benefits (least invasive technique). \par \par For these treatment, we also discussed the possible need for additional therapies for persistent stone burden. \par We stressed that when ureteral stents are inserted they are temporary and must be removed within 3 months of placement. Otherwise encrustation, urosepsis, obstruction can occur.\par \par \par

## 2023-03-29 ENCOUNTER — OUTPATIENT (OUTPATIENT)
Dept: OUTPATIENT SERVICES | Facility: HOSPITAL | Age: 63
LOS: 1 days | End: 2023-03-29

## 2023-03-29 DIAGNOSIS — Z98.890 OTHER SPECIFIED POSTPROCEDURAL STATES: Chronic | ICD-10-CM

## 2023-03-29 DIAGNOSIS — N20.0 CALCULUS OF KIDNEY: Chronic | ICD-10-CM

## 2023-03-29 DIAGNOSIS — Z00.00 ENCOUNTER FOR GENERAL ADULT MEDICAL EXAMINATION WITHOUT ABNORMAL FINDINGS: ICD-10-CM

## 2023-03-29 DIAGNOSIS — M54.50 LOW BACK PAIN, UNSPECIFIED: ICD-10-CM

## 2023-05-10 ENCOUNTER — RX RENEWAL (OUTPATIENT)
Age: 63
End: 2023-05-10

## 2023-05-26 ENCOUNTER — OUTPATIENT (OUTPATIENT)
Dept: OUTPATIENT SERVICES | Facility: HOSPITAL | Age: 63
LOS: 1 days | End: 2023-05-26
Payer: MEDICAID

## 2023-05-26 ENCOUNTER — RESULT REVIEW (OUTPATIENT)
Age: 63
End: 2023-05-26

## 2023-05-26 VITALS
RESPIRATION RATE: 16 BRPM | TEMPERATURE: 98 F | HEIGHT: 67 IN | SYSTOLIC BLOOD PRESSURE: 165 MMHG | WEIGHT: 187.39 LBS | OXYGEN SATURATION: 98 % | HEART RATE: 60 BPM | DIASTOLIC BLOOD PRESSURE: 80 MMHG

## 2023-05-26 DIAGNOSIS — N20.0 CALCULUS OF KIDNEY: Chronic | ICD-10-CM

## 2023-05-26 DIAGNOSIS — Z98.890 OTHER SPECIFIED POSTPROCEDURAL STATES: Chronic | ICD-10-CM

## 2023-05-26 DIAGNOSIS — N20.0 CALCULUS OF KIDNEY: ICD-10-CM

## 2023-05-26 DIAGNOSIS — Z01.818 ENCOUNTER FOR OTHER PREPROCEDURAL EXAMINATION: ICD-10-CM

## 2023-05-26 LAB
A1C WITH ESTIMATED AVERAGE GLUCOSE RESULT: 8.8 % — HIGH (ref 4–5.6)
ALBUMIN SERPL ELPH-MCNC: 4.6 G/DL — SIGNIFICANT CHANGE UP (ref 3.5–5.2)
ALP SERPL-CCNC: 72 U/L — SIGNIFICANT CHANGE UP (ref 30–115)
ALT FLD-CCNC: 25 U/L — SIGNIFICANT CHANGE UP (ref 0–41)
ANION GAP SERPL CALC-SCNC: 16 MMOL/L — HIGH (ref 7–14)
APPEARANCE UR: CLEAR — SIGNIFICANT CHANGE UP
APTT BLD: 30.3 SEC — SIGNIFICANT CHANGE UP (ref 27–39.2)
AST SERPL-CCNC: 30 U/L — SIGNIFICANT CHANGE UP (ref 0–41)
BASOPHILS # BLD AUTO: 0.03 K/UL — SIGNIFICANT CHANGE UP (ref 0–0.2)
BASOPHILS NFR BLD AUTO: 0.5 % — SIGNIFICANT CHANGE UP (ref 0–1)
BILIRUB SERPL-MCNC: 0.3 MG/DL — SIGNIFICANT CHANGE UP (ref 0.2–1.2)
BILIRUB UR-MCNC: NEGATIVE — SIGNIFICANT CHANGE UP
BUN SERPL-MCNC: 16 MG/DL — SIGNIFICANT CHANGE UP (ref 10–20)
CALCIUM SERPL-MCNC: 10.1 MG/DL — SIGNIFICANT CHANGE UP (ref 8.4–10.5)
CHLORIDE SERPL-SCNC: 105 MMOL/L — SIGNIFICANT CHANGE UP (ref 98–110)
CO2 SERPL-SCNC: 23 MMOL/L — SIGNIFICANT CHANGE UP (ref 17–32)
COLOR SPEC: SIGNIFICANT CHANGE UP
CREAT SERPL-MCNC: 0.9 MG/DL — SIGNIFICANT CHANGE UP (ref 0.7–1.5)
DIFF PNL FLD: NEGATIVE — SIGNIFICANT CHANGE UP
EGFR: 97 ML/MIN/1.73M2 — SIGNIFICANT CHANGE UP
EOSINOPHIL # BLD AUTO: 0.23 K/UL — SIGNIFICANT CHANGE UP (ref 0–0.7)
EOSINOPHIL NFR BLD AUTO: 3.9 % — SIGNIFICANT CHANGE UP (ref 0–8)
ESTIMATED AVERAGE GLUCOSE: 206 MG/DL — HIGH (ref 68–114)
GLUCOSE SERPL-MCNC: 137 MG/DL — HIGH (ref 70–99)
GLUCOSE UR QL: NEGATIVE — SIGNIFICANT CHANGE UP
HCT VFR BLD CALC: 38.6 % — LOW (ref 42–52)
HGB BLD-MCNC: 12.6 G/DL — LOW (ref 14–18)
IMM GRANULOCYTES NFR BLD AUTO: 0.8 % — HIGH (ref 0.1–0.3)
INR BLD: 0.94 RATIO — SIGNIFICANT CHANGE UP (ref 0.65–1.3)
KETONES UR-MCNC: NEGATIVE — SIGNIFICANT CHANGE UP
LEUKOCYTE ESTERASE UR-ACNC: NEGATIVE — SIGNIFICANT CHANGE UP
LYMPHOCYTES # BLD AUTO: 1.55 K/UL — SIGNIFICANT CHANGE UP (ref 1.2–3.4)
LYMPHOCYTES # BLD AUTO: 26.1 % — SIGNIFICANT CHANGE UP (ref 20.5–51.1)
MCHC RBC-ENTMCNC: 30.6 PG — SIGNIFICANT CHANGE UP (ref 27–31)
MCHC RBC-ENTMCNC: 32.6 G/DL — SIGNIFICANT CHANGE UP (ref 32–37)
MCV RBC AUTO: 93.7 FL — SIGNIFICANT CHANGE UP (ref 80–94)
MONOCYTES # BLD AUTO: 0.36 K/UL — SIGNIFICANT CHANGE UP (ref 0.1–0.6)
MONOCYTES NFR BLD AUTO: 6.1 % — SIGNIFICANT CHANGE UP (ref 1.7–9.3)
NEUTROPHILS # BLD AUTO: 3.72 K/UL — SIGNIFICANT CHANGE UP (ref 1.4–6.5)
NEUTROPHILS NFR BLD AUTO: 62.6 % — SIGNIFICANT CHANGE UP (ref 42.2–75.2)
NITRITE UR-MCNC: NEGATIVE — SIGNIFICANT CHANGE UP
NRBC # BLD: 0 /100 WBCS — SIGNIFICANT CHANGE UP (ref 0–0)
PH UR: 6 — SIGNIFICANT CHANGE UP (ref 5–8)
PLATELET # BLD AUTO: 154 K/UL — SIGNIFICANT CHANGE UP (ref 130–400)
PMV BLD: 10.8 FL — HIGH (ref 7.4–10.4)
POTASSIUM SERPL-MCNC: 4.8 MMOL/L — SIGNIFICANT CHANGE UP (ref 3.5–5)
POTASSIUM SERPL-SCNC: 4.8 MMOL/L — SIGNIFICANT CHANGE UP (ref 3.5–5)
PROT SERPL-MCNC: 7.6 G/DL — SIGNIFICANT CHANGE UP (ref 6–8)
PROT UR-MCNC: SIGNIFICANT CHANGE UP
PROTHROM AB SERPL-ACNC: 10.7 SEC — SIGNIFICANT CHANGE UP (ref 9.95–12.87)
RBC # BLD: 4.12 M/UL — LOW (ref 4.7–6.1)
RBC # FLD: 14.5 % — SIGNIFICANT CHANGE UP (ref 11.5–14.5)
SODIUM SERPL-SCNC: 144 MMOL/L — SIGNIFICANT CHANGE UP (ref 135–146)
SP GR SPEC: 1.02 — SIGNIFICANT CHANGE UP (ref 1.01–1.03)
UROBILINOGEN FLD QL: SIGNIFICANT CHANGE UP
WBC # BLD: 5.94 K/UL — SIGNIFICANT CHANGE UP (ref 4.8–10.8)
WBC # FLD AUTO: 5.94 K/UL — SIGNIFICANT CHANGE UP (ref 4.8–10.8)

## 2023-05-26 PROCEDURE — 85610 PROTHROMBIN TIME: CPT

## 2023-05-26 PROCEDURE — 87086 URINE CULTURE/COLONY COUNT: CPT

## 2023-05-26 PROCEDURE — 85730 THROMBOPLASTIN TIME PARTIAL: CPT

## 2023-05-26 PROCEDURE — 93005 ELECTROCARDIOGRAM TRACING: CPT

## 2023-05-26 PROCEDURE — 71046 X-RAY EXAM CHEST 2 VIEWS: CPT

## 2023-05-26 PROCEDURE — 71046 X-RAY EXAM CHEST 2 VIEWS: CPT | Mod: 26

## 2023-05-26 PROCEDURE — 36415 COLL VENOUS BLD VENIPUNCTURE: CPT

## 2023-05-26 PROCEDURE — 80053 COMPREHEN METABOLIC PANEL: CPT

## 2023-05-26 PROCEDURE — 93010 ELECTROCARDIOGRAM REPORT: CPT

## 2023-05-26 PROCEDURE — 99214 OFFICE O/P EST MOD 30 MIN: CPT | Mod: 25

## 2023-05-26 PROCEDURE — 85025 COMPLETE CBC W/AUTO DIFF WBC: CPT

## 2023-05-26 PROCEDURE — 81003 URINALYSIS AUTO W/O SCOPE: CPT

## 2023-05-26 PROCEDURE — 83036 HEMOGLOBIN GLYCOSYLATED A1C: CPT

## 2023-05-26 RX ORDER — CLOPIDOGREL BISULFATE 75 MG/1
1 TABLET, FILM COATED ORAL
Qty: 0 | Refills: 0 | DISCHARGE

## 2023-05-26 RX ORDER — ASPIRIN/CALCIUM CARB/MAGNESIUM 324 MG
1 TABLET ORAL
Qty: 0 | Refills: 0 | DISCHARGE

## 2023-05-26 RX ORDER — METFORMIN HYDROCHLORIDE 850 MG/1
0 TABLET ORAL
Qty: 0 | Refills: 0 | DISCHARGE

## 2023-05-26 NOTE — H&P PST ADULT - HISTORY OF PRESENT ILLNESS
Patient is a  62 year old  male presenting to PAST in preparation for LEFT EXTRACORPOREAL SHOCK WAVE LITHOTRIPSY   on 6/2   under LSB anesthesia by Dr. Cai .  RIGHT 6/9/23  pt reports having hx b/l kidney stones. occasionally "uncomfortable" denies hematuria or dysuria  PATIENT CURRENTLY DENIES CHEST PAIN  SHORTNESS OF BREATH  PALPITATIONS,  DYSURIA, OR UPPER RESPIRATORY INFECTION IN PAST 2 WEEKS  EXERCISE  TOLERANCE  2 FLIGHT OF STAIRS  WITHOUT SHORTNESS OF BREATH  denies travel outside the [place] in the past 30 days  Patient denies any signs or symptoms of COVID 19 pt advised self quarantine till day of procedure  Anesthesia Alert  YES--Difficult Airway class 4  NO--History of neck surgery or radiation  NO--Limited ROM of neck  NO--History of Malignant hyperthermia  NO--No personal or family history of Pseudocholinesterase deficiency.  NO--Prior Anesthesia Complication  NO--Latex Allergy  NO--Loose teeth  NO--History of Rheumatoid Arthritis  NO--Bleeding risk  YES--LOIS- Does not use machine  NO--Other_____    PT DENIES ANY RASHES, ABRASION, OR OPEN WOUNDS OR CUTS    AS PER THE PT, THIS IS HIS/HER COMPLETE MEDICAL AND SURGICAL HX, INCLUDING MEDICATIONS PRESCRIBED AND OVER THE COUNTER    Patient verbalized understanding of instructions and was given the opportunity to ask questions and have them answered.    pt denies any suicidal ideation or thoughts, pt states not a threat to self or others

## 2023-05-26 NOTE — H&P PST ADULT - NSICDXPASTMEDICALHX_GEN_ALL_CORE_FT
PAST MEDICAL HISTORY:  Brain aneurysm     Diabetes     High cholesterol     History of medical problems former smoker quit 11/2019, yazan    HTN (hypertension)     Kidney stones     TIA (transient ischemic attack)      PAST MEDICAL HISTORY:  Brain aneurysm     CVA (cerebral vascular accident)     Diabetes     High cholesterol     History of medical problems former smoker quit 11/2019, yazan    HTN (hypertension)     Kidney stones

## 2023-05-27 DIAGNOSIS — Z01.818 ENCOUNTER FOR OTHER PREPROCEDURAL EXAMINATION: ICD-10-CM

## 2023-05-27 DIAGNOSIS — N20.0 CALCULUS OF KIDNEY: ICD-10-CM

## 2023-05-27 LAB
CULTURE RESULTS: SIGNIFICANT CHANGE UP
SPECIMEN SOURCE: SIGNIFICANT CHANGE UP

## 2023-05-31 ENCOUNTER — NON-APPOINTMENT (OUTPATIENT)
Age: 63
End: 2023-05-31

## 2023-06-02 ENCOUNTER — APPOINTMENT (OUTPATIENT)
Dept: UROLOGY | Facility: HOSPITAL | Age: 63
End: 2023-06-02

## 2023-06-02 ENCOUNTER — OUTPATIENT (OUTPATIENT)
Dept: INPATIENT UNIT | Facility: HOSPITAL | Age: 63
LOS: 1 days | Discharge: HOME | End: 2023-06-02
Payer: MEDICAID

## 2023-06-02 ENCOUNTER — TRANSCRIPTION ENCOUNTER (OUTPATIENT)
Age: 63
End: 2023-06-02

## 2023-06-02 VITALS
SYSTOLIC BLOOD PRESSURE: 150 MMHG | TEMPERATURE: 98 F | RESPIRATION RATE: 18 BRPM | DIASTOLIC BLOOD PRESSURE: 90 MMHG | HEIGHT: 67 IN | OXYGEN SATURATION: 100 % | WEIGHT: 184.97 LBS | HEART RATE: 83 BPM

## 2023-06-02 VITALS — SYSTOLIC BLOOD PRESSURE: 151 MMHG | HEART RATE: 67 BPM | DIASTOLIC BLOOD PRESSURE: 70 MMHG | RESPIRATION RATE: 17 BRPM

## 2023-06-02 DIAGNOSIS — Z98.890 OTHER SPECIFIED POSTPROCEDURAL STATES: Chronic | ICD-10-CM

## 2023-06-02 DIAGNOSIS — N20.0 CALCULUS OF KIDNEY: ICD-10-CM

## 2023-06-02 DIAGNOSIS — N20.0 CALCULUS OF KIDNEY: Chronic | ICD-10-CM

## 2023-06-02 PROCEDURE — 74018 RADEX ABDOMEN 1 VIEW: CPT | Mod: 26

## 2023-06-02 PROCEDURE — 74018 RADEX ABDOMEN 1 VIEW: CPT

## 2023-06-02 PROCEDURE — 82962 GLUCOSE BLOOD TEST: CPT

## 2023-06-02 PROCEDURE — 50590 FRAGMENTING OF KIDNEY STONE: CPT | Mod: LT

## 2023-06-02 RX ORDER — TAMSULOSIN HYDROCHLORIDE 0.4 MG/1
1 CAPSULE ORAL
Qty: 14 | Refills: 0
Start: 2023-06-02 | End: 2023-06-15

## 2023-06-02 RX ORDER — HYDROMORPHONE HYDROCHLORIDE 2 MG/ML
0.5 INJECTION INTRAMUSCULAR; INTRAVENOUS; SUBCUTANEOUS
Refills: 0 | Status: DISCONTINUED | OUTPATIENT
Start: 2023-06-02 | End: 2023-06-02

## 2023-06-02 RX ORDER — SODIUM CHLORIDE 9 MG/ML
1000 INJECTION, SOLUTION INTRAVENOUS
Refills: 0 | Status: DISCONTINUED | OUTPATIENT
Start: 2023-06-02 | End: 2023-06-02

## 2023-06-02 NOTE — PRE-ANESTHESIA EVALUATION ADULT - HEIGHT IN INCHES
Refill requested for:     spironolactone-hydrochlorothiazide   Last filled on:     6/22/2021  #90  1Refill    Last office visit:     12/14/2021  Pending office visit NA    Medication refilled per protocol.    Please call patient to schedule follow up (6/14/2022) HTN   7
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
## 2023-06-02 NOTE — ASU DISCHARGE PLAN (ADULT/PEDIATRIC) - ASU DC SPECIAL INSTRUCTIONSFT
Please take flomax ( at pharmacy) to help pass stones. Tylenol should be sufficient as needed for pain. Drinks approximately 3 liters of water every day. If you develop fevers/chills >100.4, visit emergency room immediately and inform Dr Cai's office.  The office will call you for a follow up appointment with Dr Cai. Please obtain an xray a few days before your appointment which the office will arrange.  You will be given a strainer to collect stones with urination. Save any stones and bring them to your next appointment.

## 2023-06-02 NOTE — ASU PATIENT PROFILE, ADULT - NSICDXPASTSURGICALHX_GEN_ALL_CORE_FT
[FreeTextEntry1] : EXAM: MG MAMMO SCREEN W DHEERAJ BI# \par PROCEDURE DATE: 01/12/2019 \par INTERPRETATION: HISTORY: \par Bilateral MG MAMMO SCREEN W DHEERAJ BI# was performed. Patient is 50 years old \par and is seen for screening. The patient has no personal history of cancer. \par The patient has a history of left needle biopsy at age 48 - benign, left \par excisional biopsy at age 45 - benign and bilateral breast augmentation at \par age 42. The patient has the following family history of breast cancer: \par sister, at age 50, breast cancer. \par RISK ASSESSMENT: \par NCI Lifetime Risk: 22.3 \par Tyrer-Cuzick Lifetime Risk: 16.5 \par CLINICAL BREAST EXAM: \par The patient reports her last clinical breast exam was performed within the \par past month. \par COMPARISON STUDIES: \par The present examination has been compared to prior imaging studies performed \par at [facility] on 09/23/2015, 09/26/2016, 10/31/2016, \par 04/28/2017 and 10/20/2017. \par MAMMOGRAM FINDINGS: \par Mammography was performed including the following views: bilateral \par craniocaudal implant displaced, bilateral craniocaudal with tomosynthesis, \par bilateral mediolateral oblique implant displaced, and bilateral mediolateral \par oblique with tomosynthesis. The examination includes digital synthetic 2D \par and digital tomosynthesis 3D images. Additional imaging analysis was \par performed using CAD (computer-aided detection) software.\par The breasts are heterogeneously dense, which may obscure small masses. \par There are bilateral saline implants. \par There is an area of benign architectural distortion corresponding to the \par site of surgery seen in the left breast. \par No suspicious mass, grouping of calcifications, or other abnormality is \par identified. \par IMPRESSION: \par There is no mammographic evidence of malignancy. \par RECOMMENDATION: \par Unless otherwise indicated by clinical findings, annual screening \par mammography recommended. \par ASSESSMENT: \par BI-RADS Category 2: Benign \par The patient will be notified of these results by telephone, and will also be \par mailed a written summary in layman's terms. \par TISH DEL ANGEL M.D., ATTENDING RADIOLOGIST \par This document has been electronically signed. Jan 14 2019 1:03PM \par \par \par \par \par EXAM: MR BREAST WAW IC BI \par PROCEDURE DATE: 07/08/2019 \par INTERPRETATION: Clinical History / Reason for exam: High risk breast cancer \par screening. \par Additional history: No personal history of cancer. The patient has a history \par of left needle biopsy at age 48-benign, left excisional biopsy at age \par 45-benign, bilateral breast augmentation at age 42. The patient has a \par following family history of breast cancer: Sister, age 50, breast cancer. \par Technique: Breast MRI is performed at 1.5 T with the patient prone and the \par breasts in a dedicated breast coil. Following a 3 plane localizer, sagittal \par T1 weighted, fat-saturated T1 weighted and fat saturated T2-weighted \par sequence; dynamic contrast enhanced sagittal images; and delayed \par post-contrast axial fat-saturated T1 weighted images were obtained. 5 mL \par gadolinium contrast was injected and 2.5 mL was discarded. Subtraction and \par MIP images were reviewed. Openbay software was used in interpretation. \par Comparison: Comparison is made to the prior breast MRIs dated 6/29/2018 and \par 5/2/2017. \par Findings: \par Amount of fibroglandular tissue: Heterogeneous fibroglandular tissue \par Background parenchymal enhancement: Mild, Symmetric \par Bilateral retropectoral implants are present. There are bilateral scattered \par similar-appearing enhancing foci within both breasts most consistent with \par normal background parenchymal enhancement. \par RIGHT BREAST: \par No suspicious abnormal enhancement is seen in the right breast. \par LEFT BREAST: \par No suspicious abnormal enhancement is seen in the left breast. Small focus \par of susceptibility artifact is noted in the upper outer quadrant of the left \par breast, compatible with a biopsy clip. \par There is no axillary lymphadenopathy. \par Impression: \par No suspicious enhancement in either breast. \par Recommendation: Unless otherwise indicated by clinical findings, annual \par screening mammography recommended. \par BI-RADS Category 1: Negative \par SHELLI VYAS M.D., RESIDENT RADIOLOGIST \par This document has been electronically signed. \par YASMINE VIERA M.D., ATTENDING RADIOLOGIST \par This document has been electronically signed. Jul 8 2019 4:45PM 
PAST SURGICAL HISTORY:  History of hernia repair umbilical & b/l inguinal    History of surgery brain aneurysm repair with stent & coil    Kidney stone lithotripsy & eswal

## 2023-06-02 NOTE — ASU DISCHARGE PLAN (ADULT/PEDIATRIC) - NS MD DC FALL RISK RISK
For information on Fall & Injury Prevention, visit: [email or web address] OR  [email or web address] OR  https://www.cdc.gov/steadi/patient.html

## 2023-06-02 NOTE — ASU PREOP CHECKLIST - INTERNAL PROSTHESES
B/L inguinal hernia & ABD hernia repair w/ possible mesh/yes(specify) stent & coil secondary to a brain aneurysm, B/L inguinal hernia & ABD hernia repair w/ possible mesh/yes(specify)

## 2023-06-02 NOTE — CHART NOTE - NSCHARTNOTEFT_GEN_A_CORE
PACU ANESTHESIA ADMISSION NOTE      Procedure: ESWL  Post op diagnosis:  Kidney Stone    ____  Intubated  TV:______       Rate: ______      FiO2: ______    __x__  Patent Airway    __x__  Full return of protective reflexes    __x__  Full recovery from anesthesia / back to baseline     Vitals:   T:        97   R:       16           BP:        140/70          Sat:      99             P:   68    Mental Status:  __x__ Awake   _____ Alert   _____ Drowsy   _____ Sedated    Nausea/Vomiting:  __x__ NO  ______Yes,   See Post - Op Orders          Pain Scale (0-10):  __x___    Treatment: ____ None    ___x_ See Post - Op/PCA Orders    Post - Operative Fluids:   ____ Oral   ___x_ See Post - Op Orders    Plan: Discharge:   _x___Home       _____Floor     _____Critical Care    _____  Other:_________________    Comments:

## 2023-06-02 NOTE — ASU PATIENT PROFILE, ADULT - FALL HARM RISK - RISK INTERVENTIONS

## 2023-06-02 NOTE — ASU PATIENT PROFILE, ADULT - NSICDXPASTMEDICALHX_GEN_ALL_CORE_FT
PAST MEDICAL HISTORY:  Brain aneurysm     CVA (cerebral vascular accident)     Diabetes     High cholesterol     History of medical problems former smoker quit 11/2019, yazan    HTN (hypertension)     Kidney stones

## 2023-06-07 DIAGNOSIS — G47.33 OBSTRUCTIVE SLEEP APNEA (ADULT) (PEDIATRIC): ICD-10-CM

## 2023-06-07 DIAGNOSIS — N20.0 CALCULUS OF KIDNEY: ICD-10-CM

## 2023-06-07 DIAGNOSIS — Z86.73 PERSONAL HISTORY OF TRANSIENT ISCHEMIC ATTACK (TIA), AND CEREBRAL INFARCTION WITHOUT RESIDUAL DEFICITS: ICD-10-CM

## 2023-06-07 DIAGNOSIS — Z87.891 PERSONAL HISTORY OF NICOTINE DEPENDENCE: ICD-10-CM

## 2023-06-07 DIAGNOSIS — E78.00 PURE HYPERCHOLESTEROLEMIA, UNSPECIFIED: ICD-10-CM

## 2023-06-07 DIAGNOSIS — Z79.84 LONG TERM (CURRENT) USE OF ORAL HYPOGLYCEMIC DRUGS: ICD-10-CM

## 2023-06-07 DIAGNOSIS — E11.9 TYPE 2 DIABETES MELLITUS WITHOUT COMPLICATIONS: ICD-10-CM

## 2023-06-07 DIAGNOSIS — I10 ESSENTIAL (PRIMARY) HYPERTENSION: ICD-10-CM

## 2023-06-09 ENCOUNTER — APPOINTMENT (OUTPATIENT)
Dept: UROLOGY | Facility: HOSPITAL | Age: 63
End: 2023-06-09

## 2023-06-09 ENCOUNTER — OUTPATIENT (OUTPATIENT)
Dept: INPATIENT UNIT | Facility: HOSPITAL | Age: 63
LOS: 1 days | Discharge: HOME | End: 2023-06-09
Payer: MEDICAID

## 2023-06-09 ENCOUNTER — TRANSCRIPTION ENCOUNTER (OUTPATIENT)
Age: 63
End: 2023-06-09

## 2023-06-09 ENCOUNTER — RESULT REVIEW (OUTPATIENT)
Age: 63
End: 2023-06-09

## 2023-06-09 VITALS
RESPIRATION RATE: 17 BRPM | DIASTOLIC BLOOD PRESSURE: 90 MMHG | WEIGHT: 184.97 LBS | SYSTOLIC BLOOD PRESSURE: 177 MMHG | HEART RATE: 72 BPM | OXYGEN SATURATION: 96 % | HEIGHT: 67 IN | TEMPERATURE: 96 F

## 2023-06-09 VITALS — SYSTOLIC BLOOD PRESSURE: 162 MMHG | RESPIRATION RATE: 16 BRPM | DIASTOLIC BLOOD PRESSURE: 77 MMHG | HEART RATE: 63 BPM

## 2023-06-09 DIAGNOSIS — Z98.890 OTHER SPECIFIED POSTPROCEDURAL STATES: Chronic | ICD-10-CM

## 2023-06-09 DIAGNOSIS — N20.0 CALCULUS OF KIDNEY: Chronic | ICD-10-CM

## 2023-06-09 DIAGNOSIS — N20.0 CALCULUS OF KIDNEY: ICD-10-CM

## 2023-06-09 PROCEDURE — 74018 RADEX ABDOMEN 1 VIEW: CPT | Mod: 26

## 2023-06-09 PROCEDURE — 82962 GLUCOSE BLOOD TEST: CPT

## 2023-06-09 PROCEDURE — 74018 RADEX ABDOMEN 1 VIEW: CPT

## 2023-06-09 PROCEDURE — 50590 FRAGMENTING OF KIDNEY STONE: CPT | Mod: RT,58

## 2023-06-09 RX ORDER — OMEPRAZOLE 10 MG/1
1 CAPSULE, DELAYED RELEASE ORAL
Refills: 0 | DISCHARGE

## 2023-06-09 RX ORDER — TRAMADOL HYDROCHLORIDE 50 MG/1
1 TABLET ORAL
Refills: 0 | DISCHARGE

## 2023-06-09 RX ORDER — MORPHINE SULFATE 50 MG/1
2 CAPSULE, EXTENDED RELEASE ORAL
Refills: 0 | Status: DISCONTINUED | OUTPATIENT
Start: 2023-06-09 | End: 2023-06-09

## 2023-06-09 RX ORDER — METFORMIN HYDROCHLORIDE 850 MG/1
1 TABLET ORAL
Refills: 0 | DISCHARGE

## 2023-06-09 RX ORDER — OXYCODONE AND ACETAMINOPHEN 5; 325 MG/1; MG/1
1 TABLET ORAL EVERY 4 HOURS
Refills: 0 | Status: DISCONTINUED | OUTPATIENT
Start: 2023-06-09 | End: 2023-06-09

## 2023-06-09 RX ORDER — GLIMEPIRIDE 1 MG
1 TABLET ORAL
Refills: 0 | DISCHARGE

## 2023-06-09 RX ORDER — ONDANSETRON 8 MG/1
4 TABLET, FILM COATED ORAL ONCE
Refills: 0 | Status: DISCONTINUED | OUTPATIENT
Start: 2023-06-09 | End: 2023-06-09

## 2023-06-09 RX ORDER — SITAGLIPTIN 50 MG/1
1 TABLET, FILM COATED ORAL
Refills: 0 | DISCHARGE

## 2023-06-09 RX ORDER — METOPROLOL TARTRATE 50 MG
1 TABLET ORAL
Qty: 0 | Refills: 0 | DISCHARGE

## 2023-06-09 RX ORDER — BUPROPION HYDROCHLORIDE 150 MG/1
1 TABLET, EXTENDED RELEASE ORAL
Refills: 0 | DISCHARGE

## 2023-06-09 RX ORDER — SODIUM CHLORIDE 9 MG/ML
1000 INJECTION, SOLUTION INTRAVENOUS
Refills: 0 | Status: DISCONTINUED | OUTPATIENT
Start: 2023-06-09 | End: 2023-06-09

## 2023-06-09 RX ORDER — ATORVASTATIN CALCIUM 80 MG/1
1 TABLET, FILM COATED ORAL
Qty: 0 | Refills: 0 | DISCHARGE

## 2023-06-09 NOTE — ASU DISCHARGE PLAN (ADULT/PEDIATRIC) - NS MD DC FALL RISK RISK
For information on Fall & Injury Prevention, visit: [email or web address] OR  [email or web address] OR  https://www.cdc.gov/steadi/patient.html

## 2023-06-09 NOTE — ASU PATIENT PROFILE, ADULT - NSICDXPASTMEDICALHX_GEN_ALL_CORE_FT
PAST MEDICAL HISTORY:  Brain aneurysm     Chronic GERD     CVA (cerebral vascular accident)     Diabetes     High cholesterol     History of medical problems former smoker quit 11/2019, yazan    HTN (hypertension)     Kidney stones

## 2023-06-09 NOTE — ASU PATIENT PROFILE, ADULT - AS SC BRADEN MOBILITY
Patient placed in stretcher in exam room 3. NC placed on patient with 2 L of O2. Patient attached to monitor with continuous pulse ox and BP cuff cycled every hour. O2 sats currently at 96 % with 2L of O2. NEHAL Quintero notified patient has been placed in room.    (3) slightly limited

## 2023-06-09 NOTE — ASU PATIENT PROFILE, ADULT - FALL HARM RISK - HARM RISK INTERVENTIONS

## 2023-06-09 NOTE — ASU DISCHARGE PLAN (ADULT/PEDIATRIC) - ASU DC SPECIAL INSTRUCTIONSFT
Tylenol should be sufficient as needed for pain. Drinks approximately 3 liters of water every day. If you develop fevers/chills >100.4, visit emergency room immediately and inform Dr Cai's office.  The office will call you for a follow up appointment with Dr Cai. Please obtain an xray a few days before your appointment which the office will arrange.  You will be given a strainer to collect stones with urination. Save any stones and bring them to your next appointment.

## 2023-06-09 NOTE — ASU PATIENT PROFILE, ADULT - VISION (WITH CORRECTIVE LENSES IF THE PATIENT USUALLY WEARS THEM):
SCDs
Partially impaired: cannot see medication labels or newsprint, but can see obstacles in path, and the surrounding layout; can count fingers at arm's length

## 2023-06-09 NOTE — CHART NOTE - NSCHARTNOTEFT_GEN_A_CORE
PACU ANESTHESIA ADMISSION NOTE      Procedure: right eswl  Post op diagnosis: renal calculi right     ____  Intubated  TV:______       Rate: ______      FiO2: ______    _x___  Patent Airway    _x___  Full return of protective reflexes    _x___  Full recovery from anesthesia / back to baseline status    Vitals:    see anesthesia record    Mental Status:  _x___ Awake   _____ Alert   _____ Drowsy   _____ Sedated    Nausea/Vomiting:  _x___  NO       ______Yes,   See Post - Op Orders         Pain Scale (0-10):  _____    Treatment: ____ None    __x__ See Post - Op/PCA Orders    Post - Operative Fluids:   ____ Oral   ___x_ See Post - Op Orders    Plan: Discharge:   _x___Home       _____Floor     _____Critical Care    _____  Other:_________________    Comments:  When criteria met and cleared by kan  No anesthesia issues or complications noted.  Discharge when criteria met.

## 2023-06-13 DIAGNOSIS — Z86.73 PERSONAL HISTORY OF TRANSIENT ISCHEMIC ATTACK (TIA), AND CEREBRAL INFARCTION WITHOUT RESIDUAL DEFICITS: ICD-10-CM

## 2023-06-13 DIAGNOSIS — K21.9 GASTRO-ESOPHAGEAL REFLUX DISEASE WITHOUT ESOPHAGITIS: ICD-10-CM

## 2023-06-13 DIAGNOSIS — E11.9 TYPE 2 DIABETES MELLITUS WITHOUT COMPLICATIONS: ICD-10-CM

## 2023-06-13 DIAGNOSIS — N20.0 CALCULUS OF KIDNEY: ICD-10-CM

## 2023-06-13 DIAGNOSIS — Z87.891 PERSONAL HISTORY OF NICOTINE DEPENDENCE: ICD-10-CM

## 2023-06-13 DIAGNOSIS — E78.00 PURE HYPERCHOLESTEROLEMIA, UNSPECIFIED: ICD-10-CM

## 2023-06-13 DIAGNOSIS — Z79.84 LONG TERM (CURRENT) USE OF ORAL HYPOGLYCEMIC DRUGS: ICD-10-CM

## 2023-06-13 DIAGNOSIS — G47.33 OBSTRUCTIVE SLEEP APNEA (ADULT) (PEDIATRIC): ICD-10-CM

## 2023-06-13 DIAGNOSIS — I10 ESSENTIAL (PRIMARY) HYPERTENSION: ICD-10-CM

## 2023-06-23 NOTE — ASU PREOP CHECKLIST - HEIGHT IN CM
Detail Level: Detailed
Add 1585x Cpt? (Do Not Bill If You Billed For The Procedure Placing The Sutures. This Is An Add-On Code That Must Be Billed With An E/M Visit Code): No
170.18

## 2023-06-26 PROBLEM — I63.9 CEREBRAL INFARCTION, UNSPECIFIED: Chronic | Status: ACTIVE | Noted: 2023-05-26

## 2023-06-26 PROBLEM — K21.9 GASTRO-ESOPHAGEAL REFLUX DISEASE WITHOUT ESOPHAGITIS: Chronic | Status: ACTIVE | Noted: 2023-06-09

## 2023-07-16 ENCOUNTER — EMERGENCY (EMERGENCY)
Facility: HOSPITAL | Age: 63
LOS: 0 days | Discharge: HOME | End: 2023-07-16
Attending: EMERGENCY MEDICINE
Payer: MEDICAID

## 2023-07-16 VITALS
WEIGHT: 179.9 LBS | SYSTOLIC BLOOD PRESSURE: 189 MMHG | DIASTOLIC BLOOD PRESSURE: 90 MMHG | OXYGEN SATURATION: 100 % | HEIGHT: 67 IN | RESPIRATION RATE: 18 BRPM | HEART RATE: 85 BPM | TEMPERATURE: 98 F

## 2023-07-16 DIAGNOSIS — M25.519 PAIN IN UNSPECIFIED SHOULDER: ICD-10-CM

## 2023-07-16 DIAGNOSIS — Z86.79 PERSONAL HISTORY OF OTHER DISEASES OF THE CIRCULATORY SYSTEM: ICD-10-CM

## 2023-07-16 DIAGNOSIS — E78.00 PURE HYPERCHOLESTEROLEMIA, UNSPECIFIED: ICD-10-CM

## 2023-07-16 DIAGNOSIS — N20.0 CALCULUS OF KIDNEY: Chronic | ICD-10-CM

## 2023-07-16 DIAGNOSIS — I10 ESSENTIAL (PRIMARY) HYPERTENSION: ICD-10-CM

## 2023-07-16 DIAGNOSIS — Z87.442 PERSONAL HISTORY OF URINARY CALCULI: ICD-10-CM

## 2023-07-16 DIAGNOSIS — Z98.890 OTHER SPECIFIED POSTPROCEDURAL STATES: Chronic | ICD-10-CM

## 2023-07-16 DIAGNOSIS — Z79.84 LONG TERM (CURRENT) USE OF ORAL HYPOGLYCEMIC DRUGS: ICD-10-CM

## 2023-07-16 DIAGNOSIS — Z86.73 PERSONAL HISTORY OF TRANSIENT ISCHEMIC ATTACK (TIA), AND CEREBRAL INFARCTION WITHOUT RESIDUAL DEFICITS: ICD-10-CM

## 2023-07-16 DIAGNOSIS — E11.9 TYPE 2 DIABETES MELLITUS WITHOUT COMPLICATIONS: ICD-10-CM

## 2023-07-16 DIAGNOSIS — K21.9 GASTRO-ESOPHAGEAL REFLUX DISEASE WITHOUT ESOPHAGITIS: ICD-10-CM

## 2023-07-16 DIAGNOSIS — G89.18 OTHER ACUTE POSTPROCEDURAL PAIN: ICD-10-CM

## 2023-07-16 PROCEDURE — 99284 EMERGENCY DEPT VISIT MOD MDM: CPT

## 2023-07-16 PROCEDURE — 99282 EMERGENCY DEPT VISIT SF MDM: CPT

## 2023-07-16 RX ORDER — METHOCARBAMOL 500 MG/1
2 TABLET, FILM COATED ORAL
Qty: 28 | Refills: 0
Start: 2023-07-16 | End: 2023-07-22

## 2023-07-16 NOTE — ED PROVIDER NOTE - NSFOLLOWUPINSTRUCTIONS_ED_ALL_ED_FT
Our Emergency Department Referral Coordinators will be reaching out to you in the next 24-48 hours from 9:00am to 5:00pm (Monday - Friday) with a follow up appointment. Please expect a phone call from the hospital in that time frame. If you do not receive a call or if you have any questions or concerns, you can reach them at [phone] or [phone].    Shoulder Pain    Many things can cause shoulder pain, including:    An injury to the area.  Overuse of the shoulder.  Arthritis.    The source of the pain can be:    Inflammation.  An injury to the shoulder joint.  An injury to a tendon, ligament, or bone.    HOME CARE INSTRUCTIONS  Take these actions to help with your pain:     Squeeze a soft ball or a foam pad as much as possible. This helps to keep the shoulder from swelling. It also helps to strengthen the arm.  Take over-the-counter and prescription medicines only as told by your health care provider.  If directed, apply ice to the area:  Put ice in a plastic bag.  Place a towel between your skin and the bag.  Leave the ice on for 20 minutes, 2–3 times per day. Stop applying ice if it does not help with the pain.  If you were given a shoulder sling or immobilizer:  Wear it as told.  Remove it to shower or bathe.  Move your arm as little as possible, but keep your hand moving to prevent swelling.    SEEK MEDICAL CARE IF:  Your pain gets worse.  Your pain is not relieved with medicines.  New pain develops in your arm, hand, or fingers.    SEEK IMMEDIATE MEDICAL CARE IF:  Your arm, hand, or fingers:  Tingle.  Become numb.  Become swollen.  Become painful.  Turn white or blue.    ADDITIONAL NOTES AND INSTRUCTIONS    Please follow up with your Primary MD in 24-48 hr.  Seek immediate medical care for any new/worsening signs or symptoms.

## 2023-07-16 NOTE — ED PROVIDER NOTE - OBJECTIVE STATEMENT
Self
pt presents to ED c/o R shoulder pain persistent since surgery with ortho at [facility], prev managed with percocet but he ran out a few days ago. sts he has been unable to reach his surgeon. pain is sharp, nonradiating, moderate. denies exacerbating or relieving factors. Denies fever/chill/HA/dizziness/chest pain/palpitation/sob/abd pain/n/v/d/ black stool/bloody stool/urinary sxs

## 2023-07-16 NOTE — ED PROVIDER NOTE - CLINICAL SUMMARY MEDICAL DECISION MAKING FREE TEXT BOX
Patient presents with right shoulder pain 3 weeks post op. Heeling well. no fevers or drainage. Requesting pain medications. unable to take nsaids. Muscle relaxant given. Advised to follow up with surgeon and pain management. Return precautions discussed.

## 2023-07-16 NOTE — ED PROVIDER NOTE - PATIENT PORTAL LINK FT
You can access the FollowMyHealth Patient Portal offered by [facility] by registering at the following website: [email or web address]. By joining [facility]’s FollowMyHealth portal, you will also be able to view your health information using other applications (apps) compatible with our system.

## 2023-07-16 NOTE — ED ADULT NURSE NOTE - NSFALLUNIVINTERV_ED_ALL_ED
Bed/Stretcher in lowest position, wheels locked, appropriate side rails in place/Call bell, personal items and telephone in reach/Instruct patient to call for assistance before getting out of bed/chair/stretcher/Non-slip footwear applied when patient is off stretcher/[place] to call system/Physically safe environment - no spills, clutter or unnecessary equipment/Purposeful proactive rounding/Room/bathroom lighting operational, light cord in reach

## 2023-07-16 NOTE — ED PROVIDER NOTE - PHYSICAL EXAMINATION
CONSTITUTIONAL: Well-appearing; well-nourished; in no apparent distress.   NECK: Supple; non-tender; no cervical lymphadenopathy.   CARDIOVASCULAR: Normal S1, S2; no murmurs, rubs, or gallops.   RESPIRATORY: Normal chest excursion with respiration  GI/: Non-distended; non-tender; no palpable organomegaly.   MS: surgical wounds to R shoulder without s/s infection but pain with ROM; otherwise normal ROM in all other joints/extremities and non-tender to palpation; distal pulses are normal.   SKIN: Normal for age and race; warm; dry; good turgor; no apparent lesions or exudate.   NEURO/PSYCH: A & O x 4; grossly unremarkable. mood and manner are appropriate.

## 2023-07-16 NOTE — ED PROVIDER NOTE - ATTENDING APP SHARED VISIT CONTRIBUTION OF CARE
61 yo M pmh of DM, HTn, brain aneurism presents with right shoulder pain. Recently had a right shoulder arthroscopy for tendon repair without complications. Since has been having pain to the shoulder. States that he was taking percocet post of but ran out. Had a normal post of visit 1 week after the surgery which was normal. no fevers. no drainage, no bleeding.     CONSTITUTIONAL: Well-developed; well-nourished; in no acute distress.   SKIN: warm, dry  HEAD: Normocephalic; atraumatic.  EYES: PERRL, EOMI, no conjunctival erythema  EXT: Normal ROM.  5/5 strength in all 4 extremities. well healing surgical incision sites around right shoulder. no erythema, no cellulitis. + tenderness around right shoulder, worse in anterior aspect. 5/5 strength, range of motion limited by pain.   LYMPH: No acute cervical adenopathy.  NEURO: Alert, oriented, grossly unremarkable. neurovascularly intact  PSYCH: Cooperative, appropriate.

## 2023-07-16 NOTE — ED ADULT NURSE NOTE - NSICDXPASTMEDICALHX_GEN_ALL_CORE_FT
PAST MEDICAL HISTORY:  Brain aneurysm     Chronic GERD     CVA (cerebral vascular accident)     Diabetes     High cholesterol     History of medical problems former smoker quit 11/2019, yazan    HTN (hypertension)     Kidney stones     
Arm band on/IV intact

## 2023-07-23 NOTE — ED ADULT NURSE NOTE - SUICIDE SCREENING QUESTION 3
[facility] Medicine  Progress Note    Patient Name: Chanel Cervantes  MRN: [id number]  Patient Class: IP- Inpatient   Admission Date: 7/18/2023  Length of Stay: 3 days  Attending Physician: Harris Dempsey MD  Primary Care Provider: ARIC Almaguer        Subjective:     Principal Problem:Septic shock        HPI:  Chanel Cervantes is a 68 y.o. White female   With PMH of DM2, HTN, bariatric surgery,  Remote cholecystectomy,  Frequent UTIs with kidney stones,  who presents with back pain.  Admitted for pancreatitis with elevated LFTs     Pt is currently confused after receiving ativan  (ER gave it to calm her for CT scan)  History taken from family at bedside     Onset of back pain overnight  Located b/l lumbar region  Radiating to b/l upper quadrants of abdomen  Occurring intermittently  Progressively worsening  Severe, causes SOB when occurring     Initially pt thought pain was due to a fall yesterday  (Mechanical, tripped over a rug, no head trauma)  +nausea, no vomiting  +intermittent chills  They are not sure about objective fever     Has had similar abdominal pain previously  Per family, this has been attributed to her ongoing ventral wall hernia  They don't bring her to ER for this usually  However pain today was much more severe than usual      Overview/Hospital Course:  Chanel Cervantes is a 68 year old female with a past medical history of obesity, ventral hernia, DM, HTN, anemia, CAD, NIKOLAS, and hypothyroidism who presented with septic shock secondary to a possible cholangitis with E coli bacteremia. Vancomycin was discontinued and she remained on meropenem. BP was maintained on a Levophed infusion. GI was consulted and performed ERCP which showed biliary sludge with a sludge ball dilating the main duct which was removed; a biliary sphincterotomy was also performed. Repeat blood cultures were negative. She was on IV fluids with LR and was NPO as she was encephalopathic. She also had  an ANTHONY as well. She also had acute hypoxic respiratory failure for which she was put on mask O2. She also had demand ischemia; TTE was done and troponin was trended. There was shock liver superimposed on the hepatocellular injury brought on by the acute cholangitis. Pulmonary/Critical Care was consulted given the patient's medical acuity.  Pulmonologist made decision to intubate patient based on patient's deep encephalopathy and inability to protect her airway.      Interval History:  Remains on ventilator.  Uremia is worsening.  Not requiring vasopressor.  Platelet count has dropped.    Review of Systems   Unable to perform ROS: Intubated   Objective:     Vital Signs (Most Recent):  Temp: 99 °F (37.2 °C) (07/22/23 1557)  Pulse: (!) 58 (07/22/23 2057)  Resp: (!) 22 (07/22/23 2057)  BP: (!) 91/51 (07/22/23 1900)  SpO2: 95 % (07/22/23 2057) Vital Signs (24h Range):  Temp:  [98.3 °F (36.8 °C)-99 °F (37.2 °C)] 99 °F (37.2 °C)  Pulse:  [53-80] 58  Resp:  [0-31] 22  SpO2:  [70 %-100 %] 95 %  BP: ()/(40-69) 91/51  Arterial Line BP: ()/(40-99) 90/40     Weight: 108.9 kg (239 lb 15.9 oz)  Body mass index is 37.6 kg/m².    Intake/Output Summary (Last 24 hours) at 7/22/2023 2125  Last data filed at 7/22/2023 1859  Gross per 24 hour   Intake 1510.38 ml   Output 1330 ml   Net 180.38 ml           Physical Exam  Constitutional:       Interventions: She is sedated and intubated.   Eyes:      Conjunctiva/sclera:      Right eye: No exudate.     Left eye: No exudate.  Neck:      Vascular: No JVD.   Cardiovascular:      Rate and Rhythm: Normal rate and regular rhythm.      Comments: Upper extremities have mild pitting edema  Pulmonary:      Effort: She is intubated.      Breath sounds: Decreased air movement present.   Abdominal:      General: Abdomen is flat. Bowel sounds are decreased. There is distension.      Hernia: A hernia is present. Hernia is present in the ventral area.   Skin:     General: Skin is warm and dry.  "          Significant Labs: All pertinent labs within the past 24 hours have been reviewed.    Significant Imaging: I have reviewed all pertinent imaging results/findings within the past 24 hours.      Assessment/Plan:      * Septic shock due to Entercoccus faecium and E.coli  Secondary to acute cholangitis  -continue IVAB  -cultures reviewed  -S/p ERCP; GI following  -Trend lactic acid  -Continue LR IV fluids and Levophed to keep MAP > 65    Antibiotics (From admission, onward)    Start     Stop Route Frequency Ordered    07/21/23 1400  DAPTOmycin (CUBICIN) 805 mg in sodium chloride 0.9% SolP 50 mL IVPB         -- IV Every 48 hours (non-standard times) 07/20/23 0749    07/19/23 1300  meropenem 1 g in sodium chloride 0.9 % 100 mL IVPB (ready to mix system)        Note to Pharmacy: Ht: 5' 7" (1.702 m)  Wt: 108.9 kg (240 lb)  Estimated Creatinine Clearance: 63.1 mL/min (based on SCr of 1.1 mg/dL).  Body mass index is 37.59 kg/m².    -- IV Every 12 hours (non-standard times) 07/19/23 0740              Gallstone pancreatitis  Present on hospital day 1.  Continue vasopressor support and crystalloid.      Encephalopathy, metabolic  Metabolic in setting of septic shock.  -assess mental status when off sedation  -Treat septic shock      Shock liver  -Treat cholangitis  -Trend LFTs  -Continue IV fluids and Levophed    Lab Results   Component Value Date     (H) 07/22/2023     (H) 07/22/2023    GGT 24 05/17/2018    ALKPHOS 286 (H) 07/22/2023    BILITOT 1.3 (H) 07/22/2023         ANTHONY (acute kidney injury)  In setting of septic shock.  -Continue Levophed and IV fluids  -Renally dose medications  -Avoid nephrotoxic agents  -Monitor UOP and electrolytes  -Trend creatinine  -nephrologist consulting    Cr has trended upward:  2.4 to 2.6 to 2.9 to 2.8.  BUN is going up as well:  49 to 56 to 64       Demand ischemia  History of CAD s/p CABG. No acute ST changes on EKG.  -Trend troponin  -Treat septic " shock  -Telemetry  -cardiology consulting -- their opinion is that the troponin is high b/c of myocardial demand ischemia    Troponin I High Sensitivity   Date Value Ref Range Status   07/20/2023 2384.3 (HH) 0.0 - 14.9 pg/mL Final     Comment:     Troponin results differ between methods. Do not use   results between Troponin methods interchangeably.    Alkaline Phospatase levels above 400 U/L may   cause false positive results.    Access hsTnI should not be used for patients taking   Asfotase anya (Strensiq).  Troponin critical result(s) called and verbal readback obtained   from Argentina Avila RN M3 by MS1 07/20/2023 04:54     07/19/2023 1148.1 (HH) 0.0 - 14.9 pg/mL Final     Comment:     Troponin results differ between methods. Do not use   results between Troponin methods interchangeably.    Alkaline Phospatase levels above 400 U/L may   cause false positive results.    Access hsTnI should not be used for patients taking   Asfotase anya (Strensiq).  Troponin critical result(s) called and verbal readback obtained from   Karan Maldonado RN M3  by MS1 07/19/2023 22:10     07/19/2023 648.1 (HH) 0.0 - 14.9 pg/mL Final     Comment:     Troponin results differ between methods. Do not use   results between Troponin methods interchangeably.    Alkaline Phospatase levels above 400 U/L may   cause false positive results.    Access hsTnI should not be used for patients taking   Asfotase anya (Strensiq).  Results confirmed, test repeated  Troponin critical result(s) repeated. Called and verbal readback   obtained from Carmela Mcintosh,ICU, RN.  by SLT1 07/19/2023 17:37         Acute hypoxemic respiratory failure  -worse  Required intubation on hospital day 3.  Pulmonologist consulting.  Get ABG's as needed.      Anemia  Stable.  -Trend Hgb with CBC    Hemoglobin   Date Value Ref Range Status   07/22/2023 9.9 (L) 12.0 - 16.0 g/dL Final   07/21/2023 10.7 (L) 12.0 - 16.0 g/dL Final           Obesity (BMI 30-39.9)  Body mass  index is 37.6 kg/m². Morbid obesity complicates all aspects of disease management from diagnostic modalities to treatment.       Hypothyroidism  TSH unremarkable.  It's 0.470.  -Continue Synthroid      Acute obstructive cholangitis  Sludge ball removed via ERCP. Likely source of bacteremia.  -Continue meropenem and daptomycin, renally dosed; follow sensitivities  -GI following  -IV fluids  -Levophed infusion  - blood cultures:  Entercoccus faecium and E.coli  -Trend LFTs      CAD (coronary artery disease)  -Hold home medications  -Telemetry      Hypokalemia  -Trend K.  Has improved.  -Replete K PRN  -Telemetry    Potassium   Date Value Ref Range Status   07/22/2023 4.0 3.5 - 5.1 mmol/L Final   07/21/2023 4.2 3.5 - 5.1 mmol/L Final         S/P bariatric surgery  Noted.      NIKOLAS on CPAP  -chronic condition.    Type 2 diabetes mellitus treated with insulin  Patient's FSGs are controlled on current medication regimen.  Last A1c reviewed-   Lab Results   Component Value Date    HGBA1C 6.9 (H) 07/18/2023     'Current correctional scale  Medium  Maintain anti-hyperglycemic dose as follows-   Antihyperglycemics (From admission, onward)    Start     Stop Route Frequency Ordered    07/19/23 1018  insulin aspart U-100 pen 1-10 Units         -- SubQ Every 6 hours PRN 07/19/23 0920        Hold Oral hypoglycemics while patient is in the hospital.    Benign essential hypertension  -Hold home medications in setting of shock        VTE Risk Mitigation (From admission, onward)         Ordered     enoxaparin injection 30 mg  Every 24 hours (non-standard times)         07/22/23 0940     IP VTE HIGH RISK PATIENT  Once         07/18/23 1642     Place sequential compression device  Until discontinued         07/18/23 1642                Discharge Planning   LUIS ALFREDO:      Code Status: Full Code   Is the patient medically ready for discharge?:     Reason for patient still in hospital (select all that apply): Patient unstable, Patient trending  condition and Treatment  Discharge Plan A: Home                  Harris Dempsey MD  Department of Hospital Medicine   [facility]   No

## 2023-08-03 ENCOUNTER — OUTPATIENT (OUTPATIENT)
Dept: OUTPATIENT SERVICES | Facility: HOSPITAL | Age: 63
LOS: 1 days | End: 2023-08-03
Payer: MEDICAID

## 2023-08-03 DIAGNOSIS — N20.0 CALCULUS OF KIDNEY: ICD-10-CM

## 2023-08-03 DIAGNOSIS — N20.0 CALCULUS OF KIDNEY: Chronic | ICD-10-CM

## 2023-08-03 DIAGNOSIS — Z98.890 OTHER SPECIFIED POSTPROCEDURAL STATES: Chronic | ICD-10-CM

## 2023-08-03 PROCEDURE — 74019 RADEX ABDOMEN 2 VIEWS: CPT | Mod: 26

## 2023-08-03 PROCEDURE — 74019 RADEX ABDOMEN 2 VIEWS: CPT

## 2023-08-04 DIAGNOSIS — N20.0 CALCULUS OF KIDNEY: ICD-10-CM

## 2023-08-08 ENCOUNTER — APPOINTMENT (OUTPATIENT)
Dept: UROLOGY | Facility: CLINIC | Age: 63
End: 2023-08-08
Payer: MEDICAID

## 2023-08-08 VITALS
WEIGHT: 190 LBS | HEIGHT: 67 IN | TEMPERATURE: 97.2 F | BODY MASS INDEX: 29.82 KG/M2 | SYSTOLIC BLOOD PRESSURE: 138 MMHG | DIASTOLIC BLOOD PRESSURE: 70 MMHG

## 2023-08-08 DIAGNOSIS — Z87.442 PERSONAL HISTORY OF URINARY CALCULI: ICD-10-CM

## 2023-08-08 PROCEDURE — 99214 OFFICE O/P EST MOD 30 MIN: CPT | Mod: 24

## 2023-08-09 PROBLEM — Z87.442 HISTORY OF NEPHROLITHIASIS: Status: ACTIVE | Noted: 2023-08-09

## 2023-08-09 NOTE — PHYSICAL EXAM
[General Appearance - Well Developed] : well developed [General Appearance - Well Nourished] : well nourished [Normal Appearance] : normal appearance [Well Groomed] : well groomed [General Appearance - In No Acute Distress] : no acute distress [Abdomen Soft] : soft [Abdomen Tenderness] : non-tender [Costovertebral Angle Tenderness] : no ~M costovertebral angle tenderness [] : no respiratory distress [Respiration, Rhythm And Depth] : normal respiratory rhythm and effort [Exaggerated Use Of Accessory Muscles For Inspiration] : no accessory muscle use [Oriented To Time, Place, And Person] : oriented to person, place, and time [Affect] : the affect was normal [Mood] : the mood was normal [Not Anxious] : not anxious [No Focal Deficits] : no focal deficits [FreeTextEntry1] : Right arm in brace

## 2023-08-09 NOTE — HISTORY OF PRESENT ILLNESS
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 no typical stricture intraluminal fibrosis or ureteral lesions, therefore patients own anatomy vs edema from chronic stone.   Status post bilateral ESWL left on 6/2/2023 and right on 6/9/2023.  Stones appear to fragment during case.  Patient states he is doing well postprocedure and states he passed some dust in the postoperative period.  He denies passing any stones or having any discomfort currently.  KUB x-ray demonstrates small bilateral radiodensities of the kidneys bilaterally.  Upon reviewing the images it seems to be some punctate fragments bilaterally  Previously Imaging from 3/24/2023.  CT ap demonstrates no hydronephrosis bilaterally.  There is symmetrical bilateral perinephric stranding.  Multiple bilateral renal calculi.  Largest right kidney interpolar region 4 mm largest left kidney 6 mm, Hounsfield units of 124.  No ureteral or bladder calculi present. On my read looking at the CT from December 2022 the stone in the right kidney appears to be closer to 6 mm.  KUB x-ray demonstrates bilateral renal calculi, largest on the right is 3 mm left 5 mm.  Urinalysis from 2/16/2023 negative for RBCs or infection  Patient suffered CVA x2, 1 prior to and 1 after endovascular brain aneurysm coil procedure at [facility].  He has been managed by them since and has not had any follow-up imaging or issues with his stones.  Sonographic postvoid residual 3 cc Urine dip negative  CBC demonstrates slight anemia BMP demonstrates elevated blood glucose level 198 Creatinine 0.95 with estimated GFR of 91 Calcium 9.9 Hemoglobin A1c 8.4  Previously: The 24 hour metabolic workup 2/2020 demonstrates hypocitraturia and low volume urine 1.8 L. In 2010 the patient had hypocitraturia as well as hypercalciuria  Old records reviewed including imaging 2010 CT a/p and there was no soft tissue right groin nodule however this was likely pre IHR. Small left non obstructing renal stone  Xray fluoroscopy images demonstrating the narrow ureter 2 cm at pelvic brim CT abdomen and pelvis 1/2019 images -and there is a 1.1 cm proximal right ureteral stone along with a second 7 mm distal right ureteral stone. Visible on . +hydro. There are bilateral nonobstructing stones in the kidneys (max 4 mm right, 5 mm left) There is also a nonspecific 2.3 cm soft tissue density anterior to the urinary bladder and right groin  Denies  PMH including previous kidney stones, recurrent UTIs.  Family History: No  malignancies Soc hx window repair  Cr 0.8 1/2020 K normal

## 2023-08-09 NOTE — ASSESSMENT
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 no typical stricture intraluminal fibrosis or ureteral lesions, therefore patients own anatomy vs edema from chronic stone. Status post bilateral ESWL left on 6/2/2023 and right on 6/9/2023.  Stones appear to fragment during case.  KUB x-ray demonstrates bilateral punctate fragments.  Plan: -Metabolic workup and follow-up in 3 months to review -General stone prevention reviewed including increase p.o. fluid, adding lemon to water, etc.. -observation of punctate bl nephrolithiasis

## 2023-08-11 ENCOUNTER — TRANSCRIPTION ENCOUNTER (OUTPATIENT)
Age: 63
End: 2023-08-11

## 2023-09-11 ENCOUNTER — RX RENEWAL (OUTPATIENT)
Age: 63
End: 2023-09-11

## 2023-09-14 ENCOUNTER — OUTPATIENT (OUTPATIENT)
Dept: OUTPATIENT SERVICES | Facility: HOSPITAL | Age: 63
LOS: 1 days | End: 2023-09-14

## 2023-09-14 DIAGNOSIS — M65.4 RADIAL STYLOID TENOSYNOVITIS [DE QUERVAIN]: ICD-10-CM

## 2023-09-14 DIAGNOSIS — Z98.890 OTHER SPECIFIED POSTPROCEDURAL STATES: Chronic | ICD-10-CM

## 2023-09-14 DIAGNOSIS — Z00.8 ENCOUNTER FOR OTHER GENERAL EXAMINATION: ICD-10-CM

## 2023-09-15 DIAGNOSIS — M65.4 RADIAL STYLOID TENOSYNOVITIS [DE QUERVAIN]: ICD-10-CM

## 2023-09-19 ENCOUNTER — OUTPATIENT (OUTPATIENT)
Dept: OUTPATIENT SERVICES | Facility: HOSPITAL | Age: 63
LOS: 1 days | End: 2023-09-19
Payer: COMMERCIAL

## 2023-09-19 DIAGNOSIS — N20.0 CALCULUS OF KIDNEY: Chronic | ICD-10-CM

## 2023-09-19 DIAGNOSIS — M65.4 RADIAL STYLOID TENOSYNOVITIS [DE QUERVAIN]: ICD-10-CM

## 2023-09-19 DIAGNOSIS — Z98.890 OTHER SPECIFIED POSTPROCEDURAL STATES: Chronic | ICD-10-CM

## 2023-09-19 PROCEDURE — 97140 MANUAL THERAPY 1/> REGIONS: CPT | Mod: GO

## 2023-09-19 PROCEDURE — 97110 THERAPEUTIC EXERCISES: CPT | Mod: GO

## 2023-09-20 ENCOUNTER — OUTPATIENT (OUTPATIENT)
Dept: OUTPATIENT SERVICES | Facility: HOSPITAL | Age: 63
LOS: 1 days | End: 2023-09-20
Payer: COMMERCIAL

## 2023-09-20 DIAGNOSIS — M25.511 PAIN IN RIGHT SHOULDER: ICD-10-CM

## 2023-09-20 DIAGNOSIS — N20.0 CALCULUS OF KIDNEY: Chronic | ICD-10-CM

## 2023-09-20 DIAGNOSIS — Z98.890 OTHER SPECIFIED POSTPROCEDURAL STATES: Chronic | ICD-10-CM

## 2023-09-20 PROCEDURE — 99214 OFFICE O/P EST MOD 30 MIN: CPT

## 2023-09-21 ENCOUNTER — OUTPATIENT (OUTPATIENT)
Dept: OUTPATIENT SERVICES | Facility: HOSPITAL | Age: 63
LOS: 1 days | End: 2023-09-21
Payer: COMMERCIAL

## 2023-09-21 DIAGNOSIS — Z98.890 OTHER SPECIFIED POSTPROCEDURAL STATES: Chronic | ICD-10-CM

## 2023-09-21 DIAGNOSIS — N20.0 CALCULUS OF KIDNEY: Chronic | ICD-10-CM

## 2023-09-21 DIAGNOSIS — M25.511 PAIN IN RIGHT SHOULDER: ICD-10-CM

## 2023-09-21 DIAGNOSIS — M65.4 RADIAL STYLOID TENOSYNOVITIS [DE QUERVAIN]: ICD-10-CM

## 2023-09-21 PROCEDURE — 97140 MANUAL THERAPY 1/> REGIONS: CPT | Mod: GO

## 2023-09-21 PROCEDURE — 97110 THERAPEUTIC EXERCISES: CPT | Mod: GO

## 2023-09-28 ENCOUNTER — OUTPATIENT (OUTPATIENT)
Dept: OUTPATIENT SERVICES | Facility: HOSPITAL | Age: 63
LOS: 1 days | End: 2023-09-28
Payer: COMMERCIAL

## 2023-09-28 DIAGNOSIS — M65.4 RADIAL STYLOID TENOSYNOVITIS [DE QUERVAIN]: ICD-10-CM

## 2023-09-28 DIAGNOSIS — Z98.890 OTHER SPECIFIED POSTPROCEDURAL STATES: Chronic | ICD-10-CM

## 2023-09-28 DIAGNOSIS — N20.0 CALCULUS OF KIDNEY: Chronic | ICD-10-CM

## 2023-09-28 PROCEDURE — 97140 MANUAL THERAPY 1/> REGIONS: CPT | Mod: GO

## 2023-09-28 PROCEDURE — 97110 THERAPEUTIC EXERCISES: CPT | Mod: GO

## 2023-10-03 ENCOUNTER — OUTPATIENT (OUTPATIENT)
Dept: OUTPATIENT SERVICES | Facility: HOSPITAL | Age: 63
LOS: 1 days | End: 2023-10-03
Payer: MEDICARE

## 2023-10-03 DIAGNOSIS — Z98.890 OTHER SPECIFIED POSTPROCEDURAL STATES: Chronic | ICD-10-CM

## 2023-10-03 DIAGNOSIS — M65.4 RADIAL STYLOID TENOSYNOVITIS [DE QUERVAIN]: ICD-10-CM

## 2023-10-03 PROCEDURE — 97110 THERAPEUTIC EXERCISES: CPT | Mod: GO

## 2023-10-03 PROCEDURE — 97140 MANUAL THERAPY 1/> REGIONS: CPT | Mod: GO

## 2023-10-04 DIAGNOSIS — M65.4 RADIAL STYLOID TENOSYNOVITIS [DE QUERVAIN]: ICD-10-CM

## 2023-10-05 ENCOUNTER — OUTPATIENT (OUTPATIENT)
Dept: OUTPATIENT SERVICES | Facility: HOSPITAL | Age: 63
LOS: 1 days | End: 2023-10-05
Payer: COMMERCIAL

## 2023-10-05 DIAGNOSIS — M65.4 RADIAL STYLOID TENOSYNOVITIS [DE QUERVAIN]: ICD-10-CM

## 2023-10-05 DIAGNOSIS — Z98.890 OTHER SPECIFIED POSTPROCEDURAL STATES: Chronic | ICD-10-CM

## 2023-10-05 DIAGNOSIS — N20.0 CALCULUS OF KIDNEY: Chronic | ICD-10-CM

## 2023-10-05 PROCEDURE — 97110 THERAPEUTIC EXERCISES: CPT | Mod: GO

## 2023-10-05 PROCEDURE — 97140 MANUAL THERAPY 1/> REGIONS: CPT | Mod: GO

## 2023-10-06 ENCOUNTER — RX RENEWAL (OUTPATIENT)
Age: 63
End: 2023-10-06

## 2023-10-10 ENCOUNTER — OUTPATIENT (OUTPATIENT)
Dept: OUTPATIENT SERVICES | Facility: HOSPITAL | Age: 63
LOS: 1 days | End: 2023-10-10

## 2023-10-10 DIAGNOSIS — N20.0 CALCULUS OF KIDNEY: Chronic | ICD-10-CM

## 2023-10-10 DIAGNOSIS — Z98.890 OTHER SPECIFIED POSTPROCEDURAL STATES: Chronic | ICD-10-CM

## 2023-10-10 DIAGNOSIS — M65.4 RADIAL STYLOID TENOSYNOVITIS [DE QUERVAIN]: ICD-10-CM

## 2023-10-12 ENCOUNTER — OUTPATIENT (OUTPATIENT)
Dept: OUTPATIENT SERVICES | Facility: HOSPITAL | Age: 63
LOS: 1 days | End: 2023-10-12

## 2023-10-12 DIAGNOSIS — N20.0 CALCULUS OF KIDNEY: Chronic | ICD-10-CM

## 2023-10-12 DIAGNOSIS — M65.4 RADIAL STYLOID TENOSYNOVITIS [DE QUERVAIN]: ICD-10-CM

## 2023-10-17 ENCOUNTER — OUTPATIENT (OUTPATIENT)
Dept: OUTPATIENT SERVICES | Facility: HOSPITAL | Age: 63
LOS: 1 days | End: 2023-10-17

## 2023-10-17 DIAGNOSIS — Z98.890 OTHER SPECIFIED POSTPROCEDURAL STATES: Chronic | ICD-10-CM

## 2023-10-17 DIAGNOSIS — M65.4 RADIAL STYLOID TENOSYNOVITIS [DE QUERVAIN]: ICD-10-CM

## 2023-10-17 DIAGNOSIS — N20.0 CALCULUS OF KIDNEY: Chronic | ICD-10-CM

## 2023-10-19 ENCOUNTER — OUTPATIENT (OUTPATIENT)
Dept: OUTPATIENT SERVICES | Facility: HOSPITAL | Age: 63
LOS: 1 days | End: 2023-10-19

## 2023-10-19 DIAGNOSIS — M65.4 RADIAL STYLOID TENOSYNOVITIS [DE QUERVAIN]: ICD-10-CM

## 2023-10-19 DIAGNOSIS — Z98.890 OTHER SPECIFIED POSTPROCEDURAL STATES: Chronic | ICD-10-CM

## 2023-10-19 DIAGNOSIS — N20.0 CALCULUS OF KIDNEY: Chronic | ICD-10-CM

## 2023-10-24 NOTE — H&P PST ADULT - NS PRO TALK SOMEONE YN
COLONOSCOPY PROCEDURE NOTE      Place of Service: [facility]    Patient: Aquiles Shay is a 73 year old male    MRN# [id number]    Date of procedure: 10/24/2023    Surgeon: Vince Leal MD    Assistants: None    Assistant Task: None    Primary Physician: Gerard Salas MD    Operative Procedure: Colonoscopy    Preoperative Diagnosis: Personal Hx of Colonic Polyps, last colonoscopy was 5  years    Anesthesia Administered: as per Anesthesia    Procedure Description: The patient was placed in the left lateral position and monitored continuously through ECG tracing, pulse oximetry monitoring and direct observations. Medications were administered incrementally over the course of the procedure to achieve an adequate level of conscious sedation. After anorectal examination was performed, the Olympus Colonoscope was inserted into the rectum and advanced under direct vision to the cecum, which was identified by IC valve and appendiceal orifice. The procedure was considered More difficult than average. Additional maneuvers used to reach the cecum, manual pressure..      During withdrawal examination, the final quality of the prep was Christiana bowel prep scale.  Right colon:3- (entire mucosa of colon segment seen well, with no residual staining, small fragments of stool, or opaque liquid)  Transverse colon: 3- (entire mucosa of colon segment seen well, with no residual staining, small fragments of stool, or opaque liquid)  Left: 3- (entire mucosa of colon segment seen well, with no residual staining, small fragments of stool, or opaque liquid)    A careful inspection was made as the colonoscope was withdrawn. A retroflexed view of the rectum was performed.  Findings and interventions are described below. Appropriate photo documentation was obtained.    Overall Aquiles Shay tolerated the procedure well, without undue discomfort, hypotension or desaturation. The patient was adequately recovered in the  endoscopy suite and was transported to PACU.    Events:   Scope in:     At Cecum: At Cecum: 0945  Scope Out:   [id number]  Scope Withdrawal time:  7  Cecum Surgically Absent:      Findings:  Anorectal: External hemorrhoids  Terminal ileum: Not examined  Cecum: Normal mucosa  Ascending colon: Normal mucosa  Transverse colon: Polyp(s) 3mm polyp removed with cold snare  Descending colon: Diverticulosis  Sigmoid colon: Diverticulosis  Rectum: hemorroids    Interventions:   1 Polyp(s) removed by cold snare polypectomy    Specimens Removed:  1 Polyp(s) removed by cold snare polypectomy    Implants/Grafts: N/A     Complications: none    Estimated Blood Loss: <1mL      Post Procedure Diagnosis:  1. 3mm transverse polyp   2. Left sided diverticulosis  3. Small internal and external hemorrhoids     Recommendations  · Avoid aspirin and NSAIDs for 5 days, High fiber diet, avoid constipation and If polyp is adenomatous, patient will need a repeat colonoscopy in 5 year(s).      Vince Leal MD  Gastroenterology  10:28 AM, 10/24/2023             no

## 2023-10-26 ENCOUNTER — OUTPATIENT (OUTPATIENT)
Dept: OUTPATIENT SERVICES | Facility: HOSPITAL | Age: 63
LOS: 1 days | End: 2023-10-26

## 2023-10-26 DIAGNOSIS — M65.4 RADIAL STYLOID TENOSYNOVITIS [DE QUERVAIN]: ICD-10-CM

## 2023-10-26 DIAGNOSIS — Z98.890 OTHER SPECIFIED POSTPROCEDURAL STATES: Chronic | ICD-10-CM

## 2023-10-26 DIAGNOSIS — N20.0 CALCULUS OF KIDNEY: Chronic | ICD-10-CM

## 2023-10-31 ENCOUNTER — OUTPATIENT (OUTPATIENT)
Dept: OUTPATIENT SERVICES | Facility: HOSPITAL | Age: 63
LOS: 1 days | End: 2023-10-31

## 2023-10-31 DIAGNOSIS — M65.4 RADIAL STYLOID TENOSYNOVITIS [DE QUERVAIN]: ICD-10-CM

## 2023-10-31 DIAGNOSIS — Z98.890 OTHER SPECIFIED POSTPROCEDURAL STATES: Chronic | ICD-10-CM

## 2023-10-31 DIAGNOSIS — N20.0 CALCULUS OF KIDNEY: Chronic | ICD-10-CM

## 2023-11-02 ENCOUNTER — OUTPATIENT (OUTPATIENT)
Dept: OUTPATIENT SERVICES | Facility: HOSPITAL | Age: 63
LOS: 1 days | End: 2023-11-02
Payer: COMMERCIAL

## 2023-11-02 DIAGNOSIS — Z98.890 OTHER SPECIFIED POSTPROCEDURAL STATES: Chronic | ICD-10-CM

## 2023-11-02 DIAGNOSIS — M65.4 RADIAL STYLOID TENOSYNOVITIS [DE QUERVAIN]: ICD-10-CM

## 2023-11-02 PROCEDURE — 97110 THERAPEUTIC EXERCISES: CPT | Mod: GO

## 2023-11-03 DIAGNOSIS — M65.4 RADIAL STYLOID TENOSYNOVITIS [DE QUERVAIN]: ICD-10-CM

## 2023-11-07 ENCOUNTER — OUTPATIENT (OUTPATIENT)
Dept: OUTPATIENT SERVICES | Facility: HOSPITAL | Age: 63
LOS: 1 days | End: 2023-11-07

## 2023-11-07 DIAGNOSIS — Z98.890 OTHER SPECIFIED POSTPROCEDURAL STATES: Chronic | ICD-10-CM

## 2023-11-07 DIAGNOSIS — M65.4 RADIAL STYLOID TENOSYNOVITIS [DE QUERVAIN]: ICD-10-CM

## 2023-11-07 DIAGNOSIS — N20.0 CALCULUS OF KIDNEY: Chronic | ICD-10-CM

## 2023-11-07 PROCEDURE — 97110 THERAPEUTIC EXERCISES: CPT | Mod: GO

## 2023-11-09 ENCOUNTER — APPOINTMENT (OUTPATIENT)
Dept: UROLOGY | Facility: CLINIC | Age: 63
End: 2023-11-09
Payer: MEDICAID

## 2023-11-09 ENCOUNTER — OUTPATIENT (OUTPATIENT)
Dept: OUTPATIENT SERVICES | Facility: HOSPITAL | Age: 63
LOS: 1 days | End: 2023-11-09

## 2023-11-09 VITALS
WEIGHT: 190 LBS | SYSTOLIC BLOOD PRESSURE: 156 MMHG | HEIGHT: 67 IN | DIASTOLIC BLOOD PRESSURE: 89 MMHG | BODY MASS INDEX: 29.82 KG/M2 | HEART RATE: 69 BPM

## 2023-11-09 DIAGNOSIS — Z98.890 OTHER SPECIFIED POSTPROCEDURAL STATES: Chronic | ICD-10-CM

## 2023-11-09 DIAGNOSIS — M65.4 RADIAL STYLOID TENOSYNOVITIS [DE QUERVAIN]: ICD-10-CM

## 2023-11-09 DIAGNOSIS — N20.0 CALCULUS OF KIDNEY: Chronic | ICD-10-CM

## 2023-11-09 PROCEDURE — 99214 OFFICE O/P EST MOD 30 MIN: CPT

## 2023-11-10 ENCOUNTER — RX RENEWAL (OUTPATIENT)
Age: 63
End: 2023-11-10

## 2023-11-14 ENCOUNTER — OUTPATIENT (OUTPATIENT)
Dept: OUTPATIENT SERVICES | Facility: HOSPITAL | Age: 63
LOS: 1 days | End: 2023-11-14

## 2023-11-14 DIAGNOSIS — M65.4 RADIAL STYLOID TENOSYNOVITIS [DE QUERVAIN]: ICD-10-CM

## 2023-11-14 DIAGNOSIS — N20.0 CALCULUS OF KIDNEY: Chronic | ICD-10-CM

## 2023-11-14 DIAGNOSIS — Z98.890 OTHER SPECIFIED POSTPROCEDURAL STATES: Chronic | ICD-10-CM

## 2023-11-16 ENCOUNTER — OUTPATIENT (OUTPATIENT)
Dept: OUTPATIENT SERVICES | Facility: HOSPITAL | Age: 63
LOS: 1 days | End: 2023-11-16

## 2023-11-16 DIAGNOSIS — Z98.890 OTHER SPECIFIED POSTPROCEDURAL STATES: Chronic | ICD-10-CM

## 2023-11-16 DIAGNOSIS — M65.4 RADIAL STYLOID TENOSYNOVITIS [DE QUERVAIN]: ICD-10-CM

## 2023-11-16 DIAGNOSIS — N20.0 CALCULUS OF KIDNEY: Chronic | ICD-10-CM

## 2023-11-21 ENCOUNTER — OUTPATIENT (OUTPATIENT)
Dept: OUTPATIENT SERVICES | Facility: HOSPITAL | Age: 63
LOS: 1 days | End: 2023-11-21

## 2023-11-21 DIAGNOSIS — Z98.890 OTHER SPECIFIED POSTPROCEDURAL STATES: Chronic | ICD-10-CM

## 2023-11-21 DIAGNOSIS — M65.4 RADIAL STYLOID TENOSYNOVITIS [DE QUERVAIN]: ICD-10-CM

## 2023-11-21 DIAGNOSIS — N20.0 CALCULUS OF KIDNEY: Chronic | ICD-10-CM

## 2023-11-28 ENCOUNTER — OUTPATIENT (OUTPATIENT)
Dept: OUTPATIENT SERVICES | Facility: HOSPITAL | Age: 63
LOS: 1 days | End: 2023-11-28

## 2023-11-28 DIAGNOSIS — Z98.890 OTHER SPECIFIED POSTPROCEDURAL STATES: Chronic | ICD-10-CM

## 2023-11-28 DIAGNOSIS — N20.0 CALCULUS OF KIDNEY: Chronic | ICD-10-CM

## 2023-11-28 DIAGNOSIS — M65.4 RADIAL STYLOID TENOSYNOVITIS [DE QUERVAIN]: ICD-10-CM

## 2023-11-30 ENCOUNTER — OUTPATIENT (OUTPATIENT)
Dept: OUTPATIENT SERVICES | Facility: HOSPITAL | Age: 63
LOS: 1 days | End: 2023-11-30

## 2023-11-30 DIAGNOSIS — Z98.890 OTHER SPECIFIED POSTPROCEDURAL STATES: Chronic | ICD-10-CM

## 2023-11-30 DIAGNOSIS — M65.4 RADIAL STYLOID TENOSYNOVITIS [DE QUERVAIN]: ICD-10-CM

## 2023-11-30 DIAGNOSIS — N20.0 CALCULUS OF KIDNEY: Chronic | ICD-10-CM

## 2023-12-05 ENCOUNTER — OUTPATIENT (OUTPATIENT)
Dept: OUTPATIENT SERVICES | Facility: HOSPITAL | Age: 63
LOS: 1 days | End: 2023-12-05
Payer: COMMERCIAL

## 2023-12-05 DIAGNOSIS — M65.4 RADIAL STYLOID TENOSYNOVITIS [DE QUERVAIN]: ICD-10-CM

## 2023-12-05 DIAGNOSIS — Z98.890 OTHER SPECIFIED POSTPROCEDURAL STATES: Chronic | ICD-10-CM

## 2023-12-05 DIAGNOSIS — N20.0 CALCULUS OF KIDNEY: Chronic | ICD-10-CM

## 2023-12-05 PROCEDURE — 97110 THERAPEUTIC EXERCISES: CPT | Mod: GO

## 2023-12-06 DIAGNOSIS — M65.4 RADIAL STYLOID TENOSYNOVITIS [DE QUERVAIN]: ICD-10-CM

## 2023-12-07 ENCOUNTER — OUTPATIENT (OUTPATIENT)
Dept: OUTPATIENT SERVICES | Facility: HOSPITAL | Age: 63
LOS: 1 days | End: 2023-12-07

## 2023-12-07 DIAGNOSIS — N20.0 CALCULUS OF KIDNEY: Chronic | ICD-10-CM

## 2023-12-07 DIAGNOSIS — M65.4 RADIAL STYLOID TENOSYNOVITIS [DE QUERVAIN]: ICD-10-CM

## 2023-12-07 DIAGNOSIS — Z98.890 OTHER SPECIFIED POSTPROCEDURAL STATES: Chronic | ICD-10-CM

## 2023-12-12 ENCOUNTER — OUTPATIENT (OUTPATIENT)
Dept: OUTPATIENT SERVICES | Facility: HOSPITAL | Age: 63
LOS: 1 days | End: 2023-12-12

## 2023-12-12 DIAGNOSIS — M65.4 RADIAL STYLOID TENOSYNOVITIS [DE QUERVAIN]: ICD-10-CM

## 2023-12-12 DIAGNOSIS — Z98.890 OTHER SPECIFIED POSTPROCEDURAL STATES: Chronic | ICD-10-CM

## 2023-12-12 DIAGNOSIS — N20.0 CALCULUS OF KIDNEY: Chronic | ICD-10-CM

## 2023-12-14 ENCOUNTER — OUTPATIENT (OUTPATIENT)
Dept: OUTPATIENT SERVICES | Facility: HOSPITAL | Age: 63
LOS: 1 days | End: 2023-12-14

## 2023-12-14 DIAGNOSIS — M65.4 RADIAL STYLOID TENOSYNOVITIS [DE QUERVAIN]: ICD-10-CM

## 2023-12-14 DIAGNOSIS — Z98.890 OTHER SPECIFIED POSTPROCEDURAL STATES: Chronic | ICD-10-CM

## 2023-12-14 DIAGNOSIS — N20.0 CALCULUS OF KIDNEY: Chronic | ICD-10-CM

## 2023-12-19 ENCOUNTER — OUTPATIENT (OUTPATIENT)
Dept: OUTPATIENT SERVICES | Facility: HOSPITAL | Age: 63
LOS: 1 days | End: 2023-12-19

## 2023-12-19 DIAGNOSIS — Z98.890 OTHER SPECIFIED POSTPROCEDURAL STATES: Chronic | ICD-10-CM

## 2023-12-19 DIAGNOSIS — M65.4 RADIAL STYLOID TENOSYNOVITIS [DE QUERVAIN]: ICD-10-CM

## 2023-12-19 DIAGNOSIS — N20.0 CALCULUS OF KIDNEY: Chronic | ICD-10-CM

## 2023-12-26 ENCOUNTER — OUTPATIENT (OUTPATIENT)
Dept: OUTPATIENT SERVICES | Facility: HOSPITAL | Age: 63
LOS: 1 days | End: 2023-12-26

## 2023-12-26 DIAGNOSIS — Z98.890 OTHER SPECIFIED POSTPROCEDURAL STATES: Chronic | ICD-10-CM

## 2023-12-26 DIAGNOSIS — M65.4 RADIAL STYLOID TENOSYNOVITIS [DE QUERVAIN]: ICD-10-CM

## 2023-12-26 DIAGNOSIS — N20.0 CALCULUS OF KIDNEY: Chronic | ICD-10-CM

## 2023-12-28 ENCOUNTER — OUTPATIENT (OUTPATIENT)
Dept: OUTPATIENT SERVICES | Facility: HOSPITAL | Age: 63
LOS: 1 days | End: 2023-12-28

## 2023-12-28 DIAGNOSIS — N20.0 CALCULUS OF KIDNEY: Chronic | ICD-10-CM

## 2023-12-28 DIAGNOSIS — Z98.890 OTHER SPECIFIED POSTPROCEDURAL STATES: Chronic | ICD-10-CM

## 2023-12-28 DIAGNOSIS — M65.4 RADIAL STYLOID TENOSYNOVITIS [DE QUERVAIN]: ICD-10-CM

## 2024-01-02 ENCOUNTER — RX RENEWAL (OUTPATIENT)
Age: 64
End: 2024-01-02

## 2024-01-02 RX ORDER — CLOTRIMAZOLE AND BETAMETHASONE DIPROPIONATE 10; .5 MG/G; MG/G
1-0.05 CREAM TOPICAL TWICE DAILY
Qty: 45 | Refills: 0 | Status: ACTIVE | COMMUNITY
Start: 2023-03-28 | End: 1900-01-01

## 2024-01-04 ENCOUNTER — OUTPATIENT (OUTPATIENT)
Dept: OUTPATIENT SERVICES | Facility: HOSPITAL | Age: 64
LOS: 1 days | End: 2024-01-04
Payer: MEDICARE

## 2024-01-04 ENCOUNTER — APPOINTMENT (OUTPATIENT)
Dept: OPHTHALMOLOGY | Facility: CLINIC | Age: 64
End: 2024-01-04

## 2024-01-04 DIAGNOSIS — M65.4 RADIAL STYLOID TENOSYNOVITIS [DE QUERVAIN]: ICD-10-CM

## 2024-01-04 DIAGNOSIS — N20.0 CALCULUS OF KIDNEY: Chronic | ICD-10-CM

## 2024-01-04 DIAGNOSIS — Z98.890 OTHER SPECIFIED POSTPROCEDURAL STATES: Chronic | ICD-10-CM

## 2024-01-04 PROCEDURE — 97110 THERAPEUTIC EXERCISES: CPT | Mod: GO

## 2024-01-05 DIAGNOSIS — M65.4 RADIAL STYLOID TENOSYNOVITIS [DE QUERVAIN]: ICD-10-CM

## 2024-01-09 ENCOUNTER — OUTPATIENT (OUTPATIENT)
Dept: OUTPATIENT SERVICES | Facility: HOSPITAL | Age: 64
LOS: 1 days | End: 2024-01-09

## 2024-01-09 DIAGNOSIS — Z98.890 OTHER SPECIFIED POSTPROCEDURAL STATES: Chronic | ICD-10-CM

## 2024-01-09 DIAGNOSIS — N20.0 CALCULUS OF KIDNEY: Chronic | ICD-10-CM

## 2024-01-09 DIAGNOSIS — M65.4 RADIAL STYLOID TENOSYNOVITIS [DE QUERVAIN]: ICD-10-CM

## 2024-01-16 ENCOUNTER — OUTPATIENT (OUTPATIENT)
Dept: OUTPATIENT SERVICES | Facility: HOSPITAL | Age: 64
LOS: 1 days | End: 2024-01-16

## 2024-01-16 DIAGNOSIS — Z98.890 OTHER SPECIFIED POSTPROCEDURAL STATES: Chronic | ICD-10-CM

## 2024-01-16 DIAGNOSIS — M65.4 RADIAL STYLOID TENOSYNOVITIS [DE QUERVAIN]: ICD-10-CM

## 2024-01-16 DIAGNOSIS — N20.0 CALCULUS OF KIDNEY: Chronic | ICD-10-CM

## 2024-01-18 ENCOUNTER — OUTPATIENT (OUTPATIENT)
Dept: OUTPATIENT SERVICES | Facility: HOSPITAL | Age: 64
LOS: 1 days | End: 2024-01-18

## 2024-01-18 DIAGNOSIS — M65.4 RADIAL STYLOID TENOSYNOVITIS [DE QUERVAIN]: ICD-10-CM

## 2024-01-18 DIAGNOSIS — N20.0 CALCULUS OF KIDNEY: Chronic | ICD-10-CM

## 2024-01-18 DIAGNOSIS — Z98.890 OTHER SPECIFIED POSTPROCEDURAL STATES: Chronic | ICD-10-CM

## 2024-04-15 ENCOUNTER — NON-APPOINTMENT (OUTPATIENT)
Age: 64
End: 2024-04-15

## 2024-04-22 ENCOUNTER — RX RENEWAL (OUTPATIENT)
Age: 64
End: 2024-04-22

## 2024-04-22 RX ORDER — POTASSIUM CITRATE 10 MEQ/1
10 MEQ TABLET, EXTENDED RELEASE ORAL TWICE DAILY
Qty: 360 | Refills: 3 | Status: ACTIVE | COMMUNITY
Start: 2023-11-09 | End: 1900-01-01

## 2024-05-06 ENCOUNTER — RESULT REVIEW (OUTPATIENT)
Age: 64
End: 2024-05-06

## 2024-05-06 ENCOUNTER — OUTPATIENT (OUTPATIENT)
Dept: OUTPATIENT SERVICES | Facility: HOSPITAL | Age: 64
LOS: 1 days | End: 2024-05-06
Payer: MEDICARE

## 2024-05-06 ENCOUNTER — NON-APPOINTMENT (OUTPATIENT)
Age: 64
End: 2024-05-06

## 2024-05-06 DIAGNOSIS — Z98.890 OTHER SPECIFIED POSTPROCEDURAL STATES: Chronic | ICD-10-CM

## 2024-05-06 DIAGNOSIS — N20.0 CALCULUS OF KIDNEY: Chronic | ICD-10-CM

## 2024-05-06 DIAGNOSIS — N20.0 CALCULUS OF KIDNEY: ICD-10-CM

## 2024-05-06 PROCEDURE — 76770 US EXAM ABDO BACK WALL COMP: CPT

## 2024-05-06 PROCEDURE — 74018 RADEX ABDOMEN 1 VIEW: CPT

## 2024-05-06 PROCEDURE — 74018 RADEX ABDOMEN 1 VIEW: CPT | Mod: 26

## 2024-05-06 PROCEDURE — 76770 US EXAM ABDO BACK WALL COMP: CPT | Mod: 26

## 2024-05-07 DIAGNOSIS — N20.0 CALCULUS OF KIDNEY: ICD-10-CM

## 2024-05-13 ENCOUNTER — APPOINTMENT (OUTPATIENT)
Dept: UROLOGY | Facility: CLINIC | Age: 64
End: 2024-05-13
Payer: MEDICARE

## 2024-05-13 ENCOUNTER — NON-APPOINTMENT (OUTPATIENT)
Age: 64
End: 2024-05-13

## 2024-05-13 DIAGNOSIS — N20.0 CALCULUS OF KIDNEY: ICD-10-CM

## 2024-05-13 DIAGNOSIS — R82.991 HYPOCITRATURIA: ICD-10-CM

## 2024-05-13 DIAGNOSIS — Z12.5 ENCOUNTER FOR SCREENING FOR MALIGNANT NEOPLASM OF PROSTATE: ICD-10-CM

## 2024-05-13 DIAGNOSIS — R82.992 HYPEROXALURIA: ICD-10-CM

## 2024-05-13 DIAGNOSIS — R31.0 GROSS HEMATURIA: ICD-10-CM

## 2024-05-13 LAB
BILIRUB UR QL STRIP: NORMAL
COLLECTION METHOD: NORMAL
GLUCOSE UR-MCNC: NORMAL
HCG UR QL: 0.2 EU/DL
HGB UR QL STRIP.AUTO: NORMAL
KETONES UR-MCNC: NORMAL
LEUKOCYTE ESTERASE UR QL STRIP: NORMAL
NITRITE UR QL STRIP: NORMAL
PH UR STRIP: 6.5
PROT UR STRIP-MCNC: NORMAL
SP GR UR STRIP: 1.01

## 2024-05-13 PROCEDURE — 99215 OFFICE O/P EST HI 40 MIN: CPT

## 2024-05-13 PROCEDURE — G2211 COMPLEX E/M VISIT ADD ON: CPT

## 2024-05-13 NOTE — HISTORY OF PRESENT ILLNESS
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 no typical stricture intraluminal fibrosis or ureteral lesions, therefore patients own anatomy vs edema from chronic stone. Status post bilateral ESWL left on 6/2/2023 and right on 6/9/2023. Stones appear to fragment during case. KUB x-ray demonstrates bilateral punctate fragments.  Presents to office today with chief complaint of gross hematuria for 3 months.  States that when he urinates he sees pink coloration of urine.  Denies any dysuria. Former smoker.  Renal sonogram in May 2024 shows left-sided nonobstructing kidney stones KUB May 2024 shows bilateral nephrolithiasis, without significant change from prior year.  Stone burden on the left slight decrease. Images personally visualized.  Patient is maintained on potassium citrate. Continues water hydration and stone prevention diet.   Prior: Litholink 08/12/2023 - urine volume 2.53L, Urine pH 5.718, citrate 387, oxalate 58  Labs 05/2023 Cr 0.9 K 4.8  KUB x-ray demonstrates small bilateral radiodensities of the kidneys bilaterally. Upon reviewing the images it seems to be some punctate fragments bilaterally  Imaging from 3/24/2023. CT ap demonstrates no hydronephrosis bilaterally. There is symmetrical bilateral perinephric stranding. Multiple bilateral renal calculi. Largest right kidney interpolar region 4 mm largest left kidney 6 mm, Hounsfield units of 124. No ureteral or bladder calculi present. On my read looking at the CT from December 2022 the stone in the right kidney appears to be closer to 6 mm.  KUB x-ray demonstrates bilateral renal calculi, largest on the right is 3 mm left 5 mm.  Urinalysis from 2/16/2023 negative for RBCs or infection  Patient suffered CVA x2, 1 prior to and 1 after endovascular brain aneurysm coil procedure at [facility]. He has been managed by them since and has not had any follow-up imaging or issues with his stones.  Sonographic postvoid residual 3 cc Urine dip negative  CBC demonstrates slight anemia BMP demonstrates elevated blood glucose level 198 Creatinine 0.95 with estimated GFR of 91 Calcium 9.9 Hemoglobin A1c 8.4  The 24 hour metabolic workup 2/2020 demonstrates hypocitraturia and low volume urine 1.8 L. In 2010 the patient had hypocitraturia as well as hypercalciuria  Old records reviewed including imaging 2010 CT a/p and there was no soft tissue right groin nodule however this was likely pre IHR. Small left non obstructing renal stone  Xray fluoroscopy images demonstrating the narrow ureter 2 cm at pelvic brim CT abdomen and pelvis 1/2019 images -and there is a 1.1 cm proximal right ureteral stone along with a second 7 mm distal right ureteral stone. Visible on . +hydro. There are bilateral nonobstructing stones in the kidneys (max 4 mm right, 5 mm left) There is also a nonspecific 2.3 cm soft tissue density anterior to the urinary bladder and right groin  Denies  PMH including previous kidney stones, recurrent UTIs. Family History: No  malignancies Soc hx window repair  Cr 0.8 1/2020 K normal

## 2024-05-13 NOTE — ASSESSMENT
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 no typical stricture intraluminal fibrosis or ureteral lesions, therefore patients own anatomy vs edema from chronic stone. Status post bilateral ESWL left on 6/2/2023 and right on 6/9/2023. Stones appear to fragment during case. KUB x-ray demonstrates bilateral punctate fragments. Persistent hypocitraturia and hyperoxaluria on Litholink Now with chief complaint of gross hematuria.  Former smoker.  We discussed differential diagnosis of hematuria which includes malignant causes such as urothelial carcinoma or benign causes such as stones. Patients elects to undergo work up which includes - CT Urogram - UA UCx UCytology - Cystoscopy. Discussed this endoscopic procedure and its associated risks such as infection.  BMP, PSA, and follow-up for cystoscopy.  cont k cit for chronic hypocitraturia For chronic nephrolithiasis patient elects observation of small bilateral nephrolithiasis

## 2024-05-15 LAB
ANION GAP SERPL CALC-SCNC: 18 MMOL/L
BACTERIA UR CULT: NORMAL
BUN SERPL-MCNC: 26 MG/DL
CALCIUM SERPL-MCNC: 10 MG/DL
CHLORIDE SERPL-SCNC: 105 MMOL/L
CO2 SERPL-SCNC: 16 MMOL/L
CREAT SERPL-MCNC: 1.1 MG/DL
EGFR: 76 ML/MIN/1.73M2
GLUCOSE SERPL-MCNC: 200 MG/DL
POTASSIUM SERPL-SCNC: 4.6 MMOL/L
PSA SERPL-MCNC: 0.13 NG/ML
SODIUM SERPL-SCNC: 139 MMOL/L

## 2024-05-18 ENCOUNTER — APPOINTMENT (OUTPATIENT)
Dept: CT IMAGING | Facility: CLINIC | Age: 64
End: 2024-05-18

## 2024-05-20 LAB — URINE CYTOLOGY: NORMAL

## 2024-06-07 ENCOUNTER — APPOINTMENT (OUTPATIENT)
Dept: ENDOCRINOLOGY | Facility: CLINIC | Age: 64
End: 2024-06-07
Payer: MEDICARE

## 2024-06-07 VITALS
HEART RATE: 76 BPM | BODY MASS INDEX: 25.05 KG/M2 | HEIGHT: 70 IN | SYSTOLIC BLOOD PRESSURE: 135 MMHG | WEIGHT: 175 LBS | DIASTOLIC BLOOD PRESSURE: 90 MMHG | OXYGEN SATURATION: 98 %

## 2024-06-07 VITALS
OXYGEN SATURATION: 98 % | HEART RATE: 76 BPM | BODY MASS INDEX: 29.82 KG/M2 | SYSTOLIC BLOOD PRESSURE: 140 MMHG | HEIGHT: 67 IN | DIASTOLIC BLOOD PRESSURE: 90 MMHG | WEIGHT: 190 LBS

## 2024-06-07 DIAGNOSIS — E78.00 PURE HYPERCHOLESTEROLEMIA, UNSPECIFIED: ICD-10-CM

## 2024-06-07 DIAGNOSIS — N20.0 CALCULUS OF KIDNEY: ICD-10-CM

## 2024-06-07 DIAGNOSIS — F32.A DEPRESSION, UNSPECIFIED: ICD-10-CM

## 2024-06-07 DIAGNOSIS — H91.90 UNSPECIFIED HEARING LOSS, UNSPECIFIED EAR: ICD-10-CM

## 2024-06-07 DIAGNOSIS — I10 ESSENTIAL (PRIMARY) HYPERTENSION: ICD-10-CM

## 2024-06-07 DIAGNOSIS — E11.65 TYPE 2 DIABETES MELLITUS WITH HYPERGLYCEMIA: ICD-10-CM

## 2024-06-07 PROCEDURE — 99204 OFFICE O/P NEW MOD 45 MIN: CPT

## 2024-06-07 PROCEDURE — G2211 COMPLEX E/M VISIT ADD ON: CPT

## 2024-06-07 RX ORDER — SEMAGLUTIDE 0.68 MG/ML
2 INJECTION, SOLUTION SUBCUTANEOUS
Qty: 3 | Refills: 1 | Status: ACTIVE | COMMUNITY
Start: 2024-06-07 | End: 1900-01-01

## 2024-06-07 RX ORDER — BLOOD-GLUCOSE SENSOR
EACH MISCELLANEOUS
Qty: 3 | Refills: 5 | Status: ACTIVE | COMMUNITY
Start: 2024-06-07 | End: 1900-01-01

## 2024-06-07 NOTE — HISTORY OF PRESENT ILLNESS
[FreeTextEntry1] : Mr. TARCY TOSCANO  Is  a 63 year  old male , brain aneurysm s/p surgery  who presented for evaluation of poorly controlled type 2 Diabetes:   Diagnosis: in 2020  Current Regimen: Semglee 20 units Humalog 7 units TIDAC , metformin 500 mg BID , Jardiance 25 mg daily ?  Previous regimens: none  Compliance: good  SMBG/CGM : am 250-300  Hypoglycemia:  no  Polyuria/polydipsia :  yes  Weight change/BMI: stable Diet: try to limit carb Exercise:  active  HBa1c trend:  Labs:   .prevention   Statin: atorvastatin unsure about dose  ACE/ARB :   no Eye examination: cataract surgery 2 months , followed with ophthalmology  Neuropathy:  no  LIV:   ACR negative

## 2024-06-07 NOTE — ASSESSMENT
Last OV: 3/23/23  Next OV: today  Last refill: 9/20/22  #90  3 R/F  Most recent Labs: 3/23/23  Last EKG (if needed): 3/23/23 [FreeTextEntry1] : Mr. TRACY TOSCANO Is a 63 year old male , brain aneurysm s/p surgery who presented for evaluation of poorly controlled type 2 Diabetes:   # poorly controlled type 2 DM  A1c 9% ( 5/2024 ) : Semglee 20 units Humalog 7 units TIDAC , metformin 500 mg BID , Jardiance 25 mg daily ? - SMBG/CGM : am 250-300 checking 4 times/ day  - will benefir from CGM as he is on insulin 4 times/day and need to check FS 4-5 times/ day and uses those numbers to take insulin dosing  - increase Semglee to 26  units and if am FS remain > 150 increase to 30 units  - Humalog 10 units + ISS 2: 50 > 120 TIDAC , given in written  - will benefit from GLp1 agonist, start Ozempic 0.25 mg Q week and increase to 0.5 mg if tolerated , discussed injection technique and possible SE , once on ozempic will titrate down insulin if possible  - continue metformin 500 mg BID And jardiance daily , hydration advised and risks of UTIs - continue statin he will confirm dose and if on fenofibrate too  - followed by ophthalmology and podiatry  - ACR negative BP ok

## 2024-06-07 NOTE — PHYSICAL EXAM
[Alert] : alert [Healthy Appearance] : healthy appearance [No Acute Distress] : no acute distress [No Proptosis] : no proptosis [No Lid Lag] : no lid lag [Thyroid Not Enlarged] : the thyroid was not enlarged [No Thyroid Nodules] : no palpable thyroid nodules [No Respiratory Distress] : no respiratory distress [No Accessory Muscle Use] : no accessory muscle use [Clear to Auscultation] : lungs were clear to auscultation bilaterally [No Murmurs] : no murmurs [Regular Rhythm] : with a regular rhythm [No Edema] : no peripheral edema [Soft] : abdomen soft [No Stigmata of Cushings Syndrome] : no stigmata of Cushings Syndrome [No Tremors] : no tremors [Oriented x3] : oriented to person, place, and time

## 2024-06-10 RX ORDER — BLOOD SUGAR DIAGNOSTIC
STRIP MISCELLANEOUS
Qty: 2 | Refills: 5 | Status: ACTIVE | COMMUNITY
Start: 2024-06-10 | End: 1900-01-01

## 2024-06-20 RX ORDER — METFORMIN ER 500 MG 500 MG/1
500 TABLET ORAL
Qty: 60 | Refills: 2 | Status: ACTIVE | COMMUNITY
Start: 2021-07-08 | End: 1900-01-01

## 2024-06-21 RX ORDER — INSULIN LISPRO 100 [IU]/ML
100 INJECTION, SOLUTION INTRAVENOUS; SUBCUTANEOUS
Qty: 1 | Refills: 1 | Status: ACTIVE | COMMUNITY
Start: 2024-06-21 | End: 1900-01-01

## 2024-06-21 RX ORDER — INSULIN GLARGINE-YFGN 100 [IU]/ML
100 INJECTION, SOLUTION SUBCUTANEOUS DAILY
Qty: 1 | Refills: 5 | Status: ACTIVE | COMMUNITY
Start: 2024-06-21 | End: 1900-01-01

## 2024-06-24 ENCOUNTER — APPOINTMENT (OUTPATIENT)
Dept: UROLOGY | Facility: CLINIC | Age: 64
End: 2024-06-24

## 2024-06-25 ENCOUNTER — OUTPATIENT (OUTPATIENT)
Dept: OUTPATIENT SERVICES | Facility: HOSPITAL | Age: 64
LOS: 1 days | End: 2024-06-25
Payer: MEDICARE

## 2024-06-25 ENCOUNTER — RESULT REVIEW (OUTPATIENT)
Age: 64
End: 2024-06-25

## 2024-06-25 DIAGNOSIS — Z98.890 OTHER SPECIFIED POSTPROCEDURAL STATES: Chronic | ICD-10-CM

## 2024-06-25 DIAGNOSIS — R31.0 GROSS HEMATURIA: ICD-10-CM

## 2024-06-25 DIAGNOSIS — N20.0 CALCULUS OF KIDNEY: Chronic | ICD-10-CM

## 2024-06-25 PROCEDURE — 74178 CT ABD&PLV WO CNTR FLWD CNTR: CPT

## 2024-06-25 PROCEDURE — 74178 CT ABD&PLV WO CNTR FLWD CNTR: CPT | Mod: 26,MH

## 2024-06-26 DIAGNOSIS — R31.0 GROSS HEMATURIA: ICD-10-CM

## 2024-07-01 ENCOUNTER — NON-APPOINTMENT (OUTPATIENT)
Age: 64
End: 2024-07-01

## 2024-07-08 ENCOUNTER — APPOINTMENT (OUTPATIENT)
Dept: UROLOGY | Facility: CLINIC | Age: 64
End: 2024-07-08
Payer: MEDICARE

## 2024-07-08 DIAGNOSIS — N20.0 CALCULUS OF KIDNEY: ICD-10-CM

## 2024-07-08 PROCEDURE — 52000 CYSTOURETHROSCOPY: CPT

## 2024-07-08 PROCEDURE — 99215 OFFICE O/P EST HI 40 MIN: CPT | Mod: 25

## 2024-07-15 ENCOUNTER — NON-APPOINTMENT (OUTPATIENT)
Age: 64
End: 2024-07-15

## 2024-07-15 LAB
BILIRUB UR QL STRIP: NORMAL
COLLECTION METHOD: NORMAL
GLUCOSE UR-MCNC: NORMAL
HCG UR QL: 0.2 EU/DL
HGB UR QL STRIP.AUTO: NORMAL
KETONES UR-MCNC: NORMAL
NITRITE UR QL STRIP: NORMAL
PH UR STRIP: 7
PROT UR STRIP-MCNC: NORMAL
SP GR UR STRIP: NORMAL

## 2024-07-16 RX ORDER — INSULIN DEGLUDEC INJECTION 100 U/ML
100 INJECTION, SOLUTION SUBCUTANEOUS
Qty: 2 | Refills: 3 | Status: ACTIVE | COMMUNITY
Start: 2024-07-16 | End: 1900-01-01

## 2024-08-09 ENCOUNTER — OUTPATIENT (OUTPATIENT)
Dept: OUTPATIENT SERVICES | Facility: HOSPITAL | Age: 64
LOS: 1 days | Discharge: HOME | End: 2024-08-09
Payer: MEDICARE

## 2024-08-09 ENCOUNTER — APPOINTMENT (OUTPATIENT)
Dept: UROLOGY | Facility: HOSPITAL | Age: 64
End: 2024-08-09

## 2024-08-09 ENCOUNTER — TRANSCRIPTION ENCOUNTER (OUTPATIENT)
Age: 64
End: 2024-08-09

## 2024-08-09 VITALS
SYSTOLIC BLOOD PRESSURE: 121 MMHG | RESPIRATION RATE: 78 BRPM | HEIGHT: 67 IN | WEIGHT: 179.02 LBS | TEMPERATURE: 98 F | HEART RATE: 78 BPM | OXYGEN SATURATION: 98 % | DIASTOLIC BLOOD PRESSURE: 82 MMHG

## 2024-08-09 VITALS — HEART RATE: 76 BPM | SYSTOLIC BLOOD PRESSURE: 150 MMHG | DIASTOLIC BLOOD PRESSURE: 80 MMHG | RESPIRATION RATE: 18 BRPM

## 2024-08-09 DIAGNOSIS — N20.0 CALCULUS OF KIDNEY: Chronic | ICD-10-CM

## 2024-08-09 DIAGNOSIS — Z98.890 OTHER SPECIFIED POSTPROCEDURAL STATES: Chronic | ICD-10-CM

## 2024-08-09 DIAGNOSIS — N20.2 CALCULUS OF KIDNEY WITH CALCULUS OF URETER: ICD-10-CM

## 2024-08-09 LAB — GLUCOSE BLDC GLUCOMTR-MCNC: 127 MG/DL — HIGH (ref 70–99)

## 2024-08-09 PROCEDURE — 52356 CYSTO/URETERO W/LITHOTRIPSY: CPT | Mod: RT,59

## 2024-08-09 PROCEDURE — C9399: CPT

## 2024-08-09 PROCEDURE — C1889: CPT

## 2024-08-09 PROCEDURE — 82962 GLUCOSE BLOOD TEST: CPT

## 2024-08-09 PROCEDURE — C1758: CPT

## 2024-08-09 PROCEDURE — 52344 CYSTO/URETERO STRICTURE TX: CPT | Mod: RT,59

## 2024-08-09 PROCEDURE — C1769: CPT

## 2024-08-09 PROCEDURE — C2617: CPT

## 2024-08-09 PROCEDURE — C1726: CPT

## 2024-08-09 RX ORDER — ONDANSETRON HCL/PF 4 MG/2 ML
4 VIAL (ML) INJECTION ONCE
Refills: 0 | Status: DISCONTINUED | OUTPATIENT
Start: 2024-08-09 | End: 2024-08-09

## 2024-08-09 RX ORDER — SULFAMETHOXAZOLE AND TRIMETHOPRIM 400; 80 MG/1; MG/1
1 TABLET ORAL
Qty: 4 | Refills: 0
Start: 2024-08-09 | End: 2024-08-10

## 2024-08-09 RX ORDER — DEXTROSE MONOHYDRATE, SODIUM CHLORIDE, SODIUM LACTATE, CALCIUM CHLORIDE, MAGNESIUM CHLORIDE 1.5; 538; 448; 18.4; 5.08 G/100ML; MG/100ML; MG/100ML; MG/100ML; MG/100ML
1000 SOLUTION INTRAPERITONEAL
Refills: 0 | Status: DISCONTINUED | OUTPATIENT
Start: 2024-08-09 | End: 2024-08-09

## 2024-08-09 RX ORDER — HYDROMORPHONE HCL IN 0.9% NACL 0.2 MG/ML
0.5 PLASTIC BAG, INJECTION (ML) INTRAVENOUS
Refills: 0 | Status: DISCONTINUED | OUTPATIENT
Start: 2024-08-09 | End: 2024-08-09

## 2024-08-09 RX ADMIN — DEXTROSE MONOHYDRATE, SODIUM CHLORIDE, SODIUM LACTATE, CALCIUM CHLORIDE, MAGNESIUM CHLORIDE 100 MILLILITER(S): 1.5; 538; 448; 18.4; 5.08 SOLUTION INTRAPERITONEAL at 12:55

## 2024-08-09 NOTE — ASU PATIENT PROFILE, ADULT - FALL HARM RISK - HARM RISK INTERVENTIONS

## 2024-08-09 NOTE — BRIEF OPERATIVE NOTE - OPERATION/FINDINGS
sp bilateral URS/LL/ureteral stent insertion w balloon dilation of right distal ureter (narrow caliber without discrete stricture), ca ox appearing stone

## 2024-08-09 NOTE — ASU DISCHARGE PLAN (ADULT/PEDIATRIC) - NS MD DC FALL RISK RISK
For information on Fall & Injury Prevention, visit: [email or web address] OR  [email or web address] OR  https://www.cdc.gov/steadi/patient.html

## 2024-08-09 NOTE — ASU DISCHARGE PLAN (ADULT/PEDIATRIC) - ASU DC SPECIAL INSTRUCTIONSFT
You had a ureteral stent placed in your ureter to help keep the ureter open post operatively. You can take ibuprofen (advil/motrin) and add tylenol as needed for pain control.   Please complete the antibiotic course which was also prescribed to your pharmacy.  Please note that the stent is temporary and must be removed.  's office will call you for a follow up appointment. MSK XR negative - No fracture, No dislocation, No foreign body

## 2024-08-09 NOTE — ASU PATIENT PROFILE, ADULT - AS SC BRADEN NUTRITION
Ankle Strain   WHAT YOU NEED TO KNOW:   A muscle strain is a twist, pull, or tear of a muscle or tendon in your ankle. An acute strain is a strain that happens suddenly. A chronic strain can happen over several days or weeks. A chronic strain can be caused by moving your ankle the same way over and over. DISCHARGE INSTRUCTIONS:   Return to the emergency department if:   You have severe pain in your ankle when you rest or put pressure on it. Your foot or toes are cold or numb. Your swelling has increased. Contact your healthcare provider if:   Your pain or swelling do not go away, even after treatment. You have questions or concerns about your condition or care. Medicines: You may need any of the following:  NSAIDs , such as ibuprofen, help decrease swelling, pain, and fever. This medicine is available with or without a doctor's order. NSAIDs can cause stomach bleeding or kidney problems in certain people. If you take blood thinner medicine, always ask if NSAIDs are safe for you. Always read the medicine label and follow directions. Do not give these medicines to children younger than 6 months without direction from a healthcare provider. Acetaminophen  decreases pain. It is available without a doctor's order. Ask how much to take and how often to take it. Follow directions. Acetaminophen can cause liver damage if not taken correctly. Take your medicine as directed. Contact your healthcare provider if you think your medicine is not helping or if you have side effects. Tell your provider if you are allergic to any medicine. Keep a list of the medicines, vitamins, and herbs you take. Include the amounts, and when and why you take them. Bring the list or the pill bottles to follow-up visits. Carry your medicine list with you in case of an emergency. Self-care:   Rest  your ankle so that it can heal. Return to normal activities as directed.     Apply ice on your ankle for 15 to 20 minutes every hour or as directed. Use an ice pack, or put crushed ice in a plastic bag. Cover it with a towel. Ice helps prevent tissue damage and decreases swelling and pain. Compress  your ankle as directed. Ask your healthcare provider how to wrap an elastic bandage around your ankle. An elastic bandage provides support and helps decrease swelling and movement so your ankle can heal. Wear it as long as directed. Elevate  your ankle above the level of your heart as often as you can. This will help decrease swelling and pain. Prop your ankle on pillows or blankets to keep it elevated comfortably. Prevent an ankle strain:   Always wear proper shoes when you play sports. Replace your old running shoes with new ones often if you are a runner. Use special shoe inserts or arch supports to correct leg or foot problems. Ask your healthcare provider for more information on shoe supports. Do warm up and cool down exercises. Stretch before you work out or do sports activities. This will help loosen your muscles and prevent injury. Cool down and stretch after your workout. Do not stop and rest after a workout without cooling down first.     Do strength training exercises. Exercises such as weight lifting help keep your muscles flexible and strong. A physical therapist or  may help you with these exercises. Slowly start your exercise or sports training program.  Follow your healthcare provider's advice on when to start exercising. Slowly increase time, distance, and intensity of your exercises. Sudden increases in how often or how intensely you train may cause you to injure your muscle again. Follow up with your doctor as directed:  Write down your questions so you remember to ask them during your visits. © Copyright [employer] 2022 Information is for End User's use only and may not be sold, redistributed or otherwise used for commercial purposes. The above information is an  only.  It is not intended as medical advice for individual conditions or treatments. Talk to your doctor, nurse or pharmacist before following any medical regimen to see if it is safe and effective for you. (4) excellent

## 2024-08-12 ENCOUNTER — NON-APPOINTMENT (OUTPATIENT)
Age: 64
End: 2024-08-12

## 2024-08-15 ENCOUNTER — APPOINTMENT (OUTPATIENT)
Dept: UROLOGY | Facility: CLINIC | Age: 64
End: 2024-08-15
Payer: MEDICARE

## 2024-08-15 DIAGNOSIS — Z87.891 PERSONAL HISTORY OF NICOTINE DEPENDENCE: ICD-10-CM

## 2024-08-15 DIAGNOSIS — E78.00 PURE HYPERCHOLESTEROLEMIA, UNSPECIFIED: ICD-10-CM

## 2024-08-15 DIAGNOSIS — Z79.4 LONG TERM (CURRENT) USE OF INSULIN: ICD-10-CM

## 2024-08-15 DIAGNOSIS — R82.991 HYPOCITRATURIA: ICD-10-CM

## 2024-08-15 DIAGNOSIS — R10.9 UNSPECIFIED ABDOMINAL PAIN: ICD-10-CM

## 2024-08-15 DIAGNOSIS — E11.9 TYPE 2 DIABETES MELLITUS WITHOUT COMPLICATIONS: ICD-10-CM

## 2024-08-15 DIAGNOSIS — N20.0 CALCULUS OF KIDNEY: ICD-10-CM

## 2024-08-15 DIAGNOSIS — Z79.84 LONG TERM (CURRENT) USE OF ORAL HYPOGLYCEMIC DRUGS: ICD-10-CM

## 2024-08-15 DIAGNOSIS — I10 ESSENTIAL (PRIMARY) HYPERTENSION: ICD-10-CM

## 2024-08-15 DIAGNOSIS — R82.992 HYPEROXALURIA: ICD-10-CM

## 2024-08-15 DIAGNOSIS — K21.9 GASTRO-ESOPHAGEAL REFLUX DISEASE WITHOUT ESOPHAGITIS: ICD-10-CM

## 2024-08-15 DIAGNOSIS — Z79.85 LONG-TERM (CURRENT) USE OF INJECTABLE NON-INSULIN ANTIDIABETIC DRUGS: ICD-10-CM

## 2024-08-15 DIAGNOSIS — Z86.73 PERSONAL HISTORY OF TRANSIENT ISCHEMIC ATTACK (TIA), AND CEREBRAL INFARCTION WITHOUT RESIDUAL DEFICITS: ICD-10-CM

## 2024-08-15 PROCEDURE — 52315 CYSTOSCOPY AND TREATMENT: CPT | Mod: LT

## 2024-08-15 PROCEDURE — 99214 OFFICE O/P EST MOD 30 MIN: CPT | Mod: 25

## 2024-08-15 RX ORDER — KETOROLAC TROMETHAMINE 10 MG/1
10 TABLET, FILM COATED ORAL EVERY 6 HOURS
Qty: 10 | Refills: 0 | Status: ACTIVE | COMMUNITY
Start: 2024-08-15 | End: 1900-01-01

## 2024-08-15 RX ORDER — TAMSULOSIN HYDROCHLORIDE 0.4 MG/1
0.4 CAPSULE ORAL
Qty: 14 | Refills: 0 | Status: ACTIVE | COMMUNITY
Start: 2024-08-15 | End: 1900-01-01

## 2024-08-15 NOTE — HISTORY OF PRESENT ILLNESS
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 no typical stricture intraluminal fibrosis or ureteral lesions, therefore patients own anatomy vs edema from chronic stone. Status post bilateral ESWL left on 6/2/2023 and right on 6/9/2023. Stones appear to fragment during case. KUB x-ray demonstrates bilateral punctate fragments. Persistent hypocitraturia and hyperoxaluria on Litholink Now with chief complaint of gross hematuria.  Former smoker.  Status post negative workup aside from nonobstructing bilateral nephrolithiasis. sp bilateral URS/LL/ureteral stent insertion w balloon dilation of right distal ureter (narrow caliber without discrete stricture), ca ox appearing stone, multiple randals plaques, stone free (08/09/2024).  Pt presents today for a cystoscopy. denies significant flank pain, gross hematuria, dysuria or associated symptoms.  States was somewhat uncomfortable w mild flank pain during the week with the stents no f/c.  previously CTU 06/25/2024I agree with the findings no upper tract filling defects and no tumors.  Bilateral nephrolithiasis multiple stones in each kidney approximately 5 mm each IMPRESSION: Circumferential urinary bladder wall thickening despite underdistention. Recommend further evaluation with urology. No hydronephrosis or gross ureteral filling defect. Nonobstructing bilateral renal stones as described. Splenomegaly. (06. KIDNEYS: Symmetric enhancement.  No hydronephrosis. Bilateral nonobstructing stones measuring up to 0.5 cm on the right and 0.5 cm on the left (series 603 image 64 and image 61). Bilateral renal cysts. Left renal hypodensity too small to further characterize. No gross ureteral filling defect.)  UA 05/13/2024 - glucosuria UCx - normal urogenital eder UCyt - NEGATIVE FOR HIGH GRADE UROTHELIAL CARCINOMA. Benign urothelial and squamous cells. Scattered histiocytes and few red blood cells.  Labs 05/13/2024 Cr 1.1 GFR 76 Ca 10.0 K 4.6 PSA 0.13  Renal sonogram in May 2024 shows left-sided nonobstructing kidney stones KUB May 2024 shows bilateral nephrolithiasis, without significant change from prior year.  Stone burden on the left slight decrease. Images personally visualized.  Litholink 08/12/2023 - urine volume 2.53L, Urine pH 5.718, citrate 387, oxalate 58  Labs 05/2023 Cr 0.9 K 4.8  Imaging from 3/24/2023. CT ap demonstrates no hydronephrosis bilaterally. There is symmetrical bilateral perinephric stranding. Multiple bilateral renal calculi. Largest right kidney interpolar region 4 mm largest left kidney 6 mm, Hounsfield units of 124. No ureteral or bladder calculi present. On my read looking at the CT from December 2022 the stone in the right kidney appears to be closer to 6 mm.  KUB x-ray demonstrates bilateral renal calculi, largest on the right is 3 mm left 5 mm.  Urinalysis from 2/16/2023 negative for RBCs or infection  Patient suffered CVA x2, 1 prior to and 1 after endovascular brain aneurysm coil procedure at [facility]. He has been managed by them since and has not had any follow-up imaging or issues with his stones.  Sonographic postvoid residual 3 cc Urine dip negative  CBC demonstrates slight anemia BMP demonstrates elevated blood glucose level 198 Creatinine 0.95 with estimated GFR of 91 Calcium 9.9 Hemoglobin A1c 8.4  The 24 hour metabolic workup 2/2020 demonstrates hypocitraturia and low volume urine 1.8 L. In 2010 the patient had hypocitraturia as well as hypercalciuria  Old records reviewed including imaging 2010 CT a/p and there was no soft tissue right groin nodule however this was likely pre IHR. Small left non obstructing renal stone  Xray fluoroscopy images demonstrating the narrow ureter 2 cm at pelvic brim CT abdomen and pelvis 1/2019 images -and there is a 1.1 cm proximal right ureteral stone along with a second 7 mm distal right ureteral stone. Visible on . +hydro. There are bilateral nonobstructing stones in the kidneys (max 4 mm right, 5 mm left) There is also a nonspecific 2.3 cm soft tissue density anterior to the urinary bladder and right groin  Denies  PMH including previous kidney stones, recurrent UTIs. Family History: No  malignancies Soc hx window repair  Cr 0.8 1/2020 K normal

## 2024-08-15 NOTE — ASSESSMENT
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 no typical stricture intraluminal fibrosis or ureteral lesions, therefore patients own anatomy vs edema from chronic stone. Status post bilateral ESWL left on 6/2/2023 and right on 6/9/2023. Stones appear to fragment during case. KUB x-ray demonstrates bilateral punctate fragments. Persistent hypocitraturia and hyperoxaluria on Litholink Now with chief complaint of gross hematuria.  Former smoker.  Status post negative workup aside from nonobstructing bilateral nephrolithiasis. sp bilateral URS/LL/ureteral stent insertion w balloon dilation of right distal ureter (narrow caliber without discrete stricture), ca ox appearing stone, multiple randals plaques, stone free (08/09/2024).  Stents successfully removed today - start Flomax + Ketorolac for residual stent colic.  He is not on anticoagulation and we discussed side effects of Toradol such as ulcer - f/u 3 months with Litholink, RBUS + KUB prior We will reassess chronic hypocitraturia and chronic hyperoxaluria.  Recommend continue stone dietary prevention cont k cit for chronic hypocitraturia

## 2024-08-15 NOTE — ADDENDUM
[FreeTextEntry1] : Patient's note was transcribed with the assistance of a medical scribe under the supervision of Dr. Cai. I, Dr. Cai, have reviewed the patient's chart and agree that it aligns with my medical decisions. Moreno Charles, our scribe, also served as a chaperone for physical examination purposes.

## 2024-09-04 ENCOUNTER — NON-APPOINTMENT (OUTPATIENT)
Age: 64
End: 2024-09-04

## 2024-09-09 ENCOUNTER — APPOINTMENT (OUTPATIENT)
Dept: ENDOCRINOLOGY | Facility: CLINIC | Age: 64
End: 2024-09-09
Payer: MEDICARE

## 2024-09-09 VITALS
HEART RATE: 75 BPM | OXYGEN SATURATION: 98 % | DIASTOLIC BLOOD PRESSURE: 72 MMHG | WEIGHT: 178 LBS | SYSTOLIC BLOOD PRESSURE: 118 MMHG | HEIGHT: 70 IN | BODY MASS INDEX: 25.48 KG/M2

## 2024-09-09 DIAGNOSIS — E78.5 HYPERLIPIDEMIA, UNSPECIFIED: ICD-10-CM

## 2024-09-09 DIAGNOSIS — E78.00 PURE HYPERCHOLESTEROLEMIA, UNSPECIFIED: ICD-10-CM

## 2024-09-09 DIAGNOSIS — E11.65 TYPE 2 DIABETES MELLITUS WITH HYPERGLYCEMIA: ICD-10-CM

## 2024-09-09 PROCEDURE — 99214 OFFICE O/P EST MOD 30 MIN: CPT

## 2024-09-09 PROCEDURE — G2211 COMPLEX E/M VISIT ADD ON: CPT

## 2024-09-09 RX ORDER — PEN NEEDLE, DIABETIC 33 GX5/32"
33G X 4 MM NEEDLE, DISPOSABLE MISCELLANEOUS
Qty: 2 | Refills: 3 | Status: ACTIVE | COMMUNITY
Start: 2024-09-09 | End: 1900-01-01

## 2024-09-09 RX ORDER — SEMAGLUTIDE 1.34 MG/ML
4 INJECTION, SOLUTION SUBCUTANEOUS
Qty: 3 | Refills: 1 | Status: ACTIVE | COMMUNITY
Start: 2024-09-09 | End: 1900-01-01

## 2024-09-09 NOTE — REVIEW OF SYSTEMS
[Polyuria] : polyuria [As Noted in HPI] : as noted in HPI [Recent Weight Loss (___ Lbs)] : recent weight loss: [unfilled] lbs

## 2024-09-09 NOTE — HISTORY OF PRESENT ILLNESS
[FreeTextEntry1] : Mr. TRACY TOSCANO  Is  a 63 year  old male , brain aneurysm s/p surgery  who presented for evaluation of poorly controlled type 2 Diabetes:   Diagnosis: in 2020  Current Regimen: Semglee 30 units Humalog 12 units TIDAC , Jardiance 10 mg daily , Ozempic 0.5 mg Q week  Previous regimens: none  Compliance: good  SMBG/CGM : using dexcom g7  gmi 7.3% tir 72 %  no lows  Hypoglycemia:  no  Polyuria/polydipsia :  yes  Weight change/BMI: stable Diet: try to limit carb Exercise:  active  HBa1c trend: 8.8%( 7/2024)    .prevention   Statin: atorvastatin 80 mg vascepa 1 g BID  ACE/ARB :   no Eye examination: cataract surgery 2 months , followed with ophthalmology  Neuropathy:  no    ACR negative

## 2024-09-09 NOTE — ASSESSMENT
[FreeTextEntry1] : Mr. TRACY TOSCANO Is a 63 year old male , brain aneurysm s/p surgery who presented for follow up  evaluation of poorly controlled type 2 Diabetes:   # poorly controlled type 2 DM  A1c 8.8%( 7/2024) but GMI now at 7.3% on  :tresiba 30 units daily  units Humalog 10-12 units TIDAC , Ozempic 0.5 mg Q week  , Jardiance 10 mg daily  - SMBG/CGM : checking 4 times/ day to  dose insulin GMI now 7.3%  - will benefit to continue use  of CGM as he is on insulin 4 times/day and need to check FS 4-5 times/ day and uses those numbers to take insulin dosing  - continue tresiba 30 units daily  -increase Ozempic to 1 mg Q week  - Humalog 8 units + ISS 2: 50 > 120 TIDAC  , lower further if any hypoglycemia  - continue Jardiance 10 mg daily , discussed risk of UTI and monitor  - continue statin  and vascepa,. check lipids  - followed by ophthalmology and podiatry  - ACR negative BP ok

## 2024-09-09 NOTE — DATA REVIEWED
[FreeTextEntry1] : 5/2024:   A1c 9.6% tg 694    glucose 183  crea 1.27  gfr 64  ACR 4   7/2024: A1c 8.8%  glucose 155 crea 1.15  GFR 72

## 2024-11-14 ENCOUNTER — APPOINTMENT (OUTPATIENT)
Dept: UROLOGY | Facility: CLINIC | Age: 64
End: 2024-11-14
Payer: MEDICARE

## 2024-11-14 VITALS
HEART RATE: 68 BPM | DIASTOLIC BLOOD PRESSURE: 80 MMHG | SYSTOLIC BLOOD PRESSURE: 120 MMHG | WEIGHT: 172 LBS | OXYGEN SATURATION: 98 % | BODY MASS INDEX: 24.62 KG/M2 | RESPIRATION RATE: 16 BRPM | HEIGHT: 70 IN | TEMPERATURE: 97.6 F

## 2024-11-14 DIAGNOSIS — R82.992 HYPEROXALURIA: ICD-10-CM

## 2024-11-14 DIAGNOSIS — R82.991 HYPOCITRATURIA: ICD-10-CM

## 2024-11-14 DIAGNOSIS — N20.0 CALCULUS OF KIDNEY: ICD-10-CM

## 2024-11-14 PROCEDURE — 99214 OFFICE O/P EST MOD 30 MIN: CPT

## 2024-11-14 PROCEDURE — G2211 COMPLEX E/M VISIT ADD ON: CPT

## 2024-11-18 ENCOUNTER — RX RENEWAL (OUTPATIENT)
Age: 64
End: 2024-11-18

## 2024-11-19 NOTE — ED PROVIDER NOTE - CLINICAL SUMMARY MEDICAL DECISION MAKING FREE TEXT BOX
Refill Routing Note   Medication(s) are not appropriate for processing by [facility] for the following reason(s):        No active prescription written by provider    [facility] action(s):  Defer      Medication Therapy Plan: The provider responsible for managing the medication isnt the PCP      Appointments  past 12m or future 3m with PCP    Date Provider   Last Visit   8/19/2024 Wally Cazares MD   Next Visit   Visit date not found Wally Cazares MD   ED visits in past 90 days: 0        Note composed:10:30 PM 11/18/2024                Patient presents with R flank pain. labs, ua, ct done. Found to have 1.1cm kidney stone. Seen by urology who recommends admission for procedure. Lactate elevated which was repeated. Patient admitted for further management.

## 2025-01-27 ENCOUNTER — APPOINTMENT (OUTPATIENT)
Dept: ENDOCRINOLOGY | Facility: CLINIC | Age: 65
End: 2025-01-27
Payer: MEDICARE

## 2025-01-27 VITALS
WEIGHT: 172 LBS | HEART RATE: 79 BPM | DIASTOLIC BLOOD PRESSURE: 80 MMHG | HEIGHT: 70 IN | BODY MASS INDEX: 24.62 KG/M2 | SYSTOLIC BLOOD PRESSURE: 140 MMHG | OXYGEN SATURATION: 98 %

## 2025-01-27 DIAGNOSIS — E11.65 TYPE 2 DIABETES MELLITUS WITH HYPERGLYCEMIA: ICD-10-CM

## 2025-01-27 DIAGNOSIS — I10 ESSENTIAL (PRIMARY) HYPERTENSION: ICD-10-CM

## 2025-01-27 DIAGNOSIS — E78.5 HYPERLIPIDEMIA, UNSPECIFIED: ICD-10-CM

## 2025-01-27 PROCEDURE — 99213 OFFICE O/P EST LOW 20 MIN: CPT

## 2025-01-27 PROCEDURE — G2211 COMPLEX E/M VISIT ADD ON: CPT

## 2025-01-27 RX ORDER — SEMAGLUTIDE 2.68 MG/ML
8 INJECTION, SOLUTION SUBCUTANEOUS
Qty: 3 | Refills: 1 | Status: ACTIVE | COMMUNITY
Start: 2025-01-27 | End: 1900-01-01

## 2025-06-02 ENCOUNTER — APPOINTMENT (OUTPATIENT)
Dept: UROLOGY | Facility: CLINIC | Age: 65
End: 2025-06-02
Payer: MEDICARE

## 2025-06-02 DIAGNOSIS — E29.1 TESTICULAR HYPOFUNCTION: ICD-10-CM

## 2025-06-02 DIAGNOSIS — R82.991 HYPOCITRATURIA: ICD-10-CM

## 2025-06-02 DIAGNOSIS — N20.0 CALCULUS OF KIDNEY: ICD-10-CM

## 2025-06-02 DIAGNOSIS — R82.992 HYPEROXALURIA: ICD-10-CM

## 2025-06-02 LAB
BILIRUB UR QL STRIP: NORMAL
COLLECTION METHOD: NORMAL
GLUCOSE UR-MCNC: 1000
HCG UR QL: 0.2 EU/DL
HGB UR QL STRIP.AUTO: NORMAL
KETONES UR-MCNC: NORMAL
LEUKOCYTE ESTERASE UR QL STRIP: NORMAL
NITRITE UR QL STRIP: NORMAL
PH UR STRIP: 8.5
PROT UR STRIP-MCNC: 30
SP GR UR STRIP: 1.01

## 2025-06-02 PROCEDURE — 99214 OFFICE O/P EST MOD 30 MIN: CPT

## 2025-06-02 PROCEDURE — G2211 COMPLEX E/M VISIT ADD ON: CPT

## 2025-07-07 ENCOUNTER — APPOINTMENT (OUTPATIENT)
Dept: UROLOGY | Facility: CLINIC | Age: 65
End: 2025-07-07
Payer: MEDICARE

## 2025-07-07 VITALS
DIASTOLIC BLOOD PRESSURE: 80 MMHG | HEART RATE: 86 BPM | HEIGHT: 70 IN | RESPIRATION RATE: 18 BRPM | WEIGHT: 170 LBS | SYSTOLIC BLOOD PRESSURE: 120 MMHG | OXYGEN SATURATION: 96 % | BODY MASS INDEX: 24.34 KG/M2

## 2025-07-07 PROCEDURE — G2211 COMPLEX E/M VISIT ADD ON: CPT

## 2025-07-07 PROCEDURE — 99214 OFFICE O/P EST MOD 30 MIN: CPT

## 2025-07-16 ENCOUNTER — RX RENEWAL (OUTPATIENT)
Age: 65
End: 2025-07-16

## 2025-07-16 RX ORDER — 70%ISOPROPYL ALCOHOL 0.7 ML/ML
70 SWAB TOPICAL
Qty: 100 | Refills: 5 | Status: ACTIVE | COMMUNITY
Start: 2025-07-16 | End: 1900-01-01

## 2025-07-21 ENCOUNTER — RX RENEWAL (OUTPATIENT)
Age: 65
End: 2025-07-21

## 2025-07-29 ENCOUNTER — APPOINTMENT (OUTPATIENT)
Dept: ENDOCRINOLOGY | Facility: CLINIC | Age: 65
End: 2025-07-29
Payer: MEDICARE

## 2025-07-29 DIAGNOSIS — I10 ESSENTIAL (PRIMARY) HYPERTENSION: ICD-10-CM

## 2025-07-29 DIAGNOSIS — E78.00 PURE HYPERCHOLESTEROLEMIA, UNSPECIFIED: ICD-10-CM

## 2025-07-29 DIAGNOSIS — E78.5 HYPERLIPIDEMIA, UNSPECIFIED: ICD-10-CM

## 2025-07-29 DIAGNOSIS — E11.65 TYPE 2 DIABETES MELLITUS WITH HYPERGLYCEMIA: ICD-10-CM

## 2025-07-29 PROCEDURE — 99212 OFFICE O/P EST SF 10 MIN: CPT | Mod: 93

## 2025-08-02 ENCOUNTER — RX RENEWAL (OUTPATIENT)
Age: 65
End: 2025-08-02

## 2025-09-02 ENCOUNTER — NON-APPOINTMENT (OUTPATIENT)
Age: 65
End: 2025-09-02

## 2025-09-03 ENCOUNTER — NON-APPOINTMENT (OUTPATIENT)
Age: 65
End: 2025-09-03

## 2025-09-04 ENCOUNTER — APPOINTMENT (OUTPATIENT)
Dept: UROLOGY | Facility: CLINIC | Age: 65
End: 2025-09-04
Payer: MEDICARE

## 2025-09-04 VITALS
SYSTOLIC BLOOD PRESSURE: 111 MMHG | HEART RATE: 75 BPM | HEIGHT: 70 IN | WEIGHT: 170 LBS | OXYGEN SATURATION: 99 % | DIASTOLIC BLOOD PRESSURE: 74 MMHG | BODY MASS INDEX: 24.34 KG/M2

## 2025-09-04 DIAGNOSIS — E29.1 TESTICULAR HYPOFUNCTION: ICD-10-CM

## 2025-09-04 PROCEDURE — G2211 COMPLEX E/M VISIT ADD ON: CPT

## 2025-09-04 PROCEDURE — 99214 OFFICE O/P EST MOD 30 MIN: CPT

## 2025-09-04 RX ORDER — TESTOSTERONE 20.25 MG/1.25G
20.25 MG/ACT GEL, METERED TRANSDERMAL
Qty: 2 | Refills: 0 | Status: ACTIVE | COMMUNITY
Start: 2025-09-04 | End: 1900-01-01